# Patient Record
Sex: FEMALE | Race: BLACK OR AFRICAN AMERICAN | Employment: OTHER | URBAN - METROPOLITAN AREA
[De-identification: names, ages, dates, MRNs, and addresses within clinical notes are randomized per-mention and may not be internally consistent; named-entity substitution may affect disease eponyms.]

---

## 2019-05-21 ENCOUNTER — HOSPITAL ENCOUNTER (INPATIENT)
Age: 24
LOS: 3 days | Discharge: HOME | DRG: 389 | End: 2019-05-26
Attending: EMERGENCY MEDICINE | Admitting: INTERNAL MEDICINE
Payer: COMMERCIAL

## 2019-05-21 ENCOUNTER — APPOINTMENT (OUTPATIENT)
Dept: CT IMAGING | Age: 24
DRG: 389 | End: 2019-05-21
Payer: COMMERCIAL

## 2019-05-21 DIAGNOSIS — R11.2 NAUSEA AND VOMITING IN ADULT: ICD-10-CM

## 2019-05-21 DIAGNOSIS — R93.5 ABNORMAL CT OF THE ABDOMEN: Primary | ICD-10-CM

## 2019-05-21 DIAGNOSIS — R10.9 ABDOMINAL PAIN, UNSPECIFIED ABDOMINAL LOCATION: ICD-10-CM

## 2019-05-21 LAB
A/G RATIO: 1.2 (ref 1.1–2.2)
ALBUMIN SERPL-MCNC: 4.7 G/DL (ref 3.4–5)
ALP BLD-CCNC: 69 U/L (ref 40–129)
ALT SERPL-CCNC: 11 U/L (ref 10–40)
AMYLASE: 129 U/L (ref 25–115)
ANION GAP SERPL CALCULATED.3IONS-SCNC: 12 MMOL/L (ref 3–16)
AST SERPL-CCNC: 18 U/L (ref 15–37)
BACTERIA: ABNORMAL /HPF
BASOPHILS ABSOLUTE: 0 K/UL (ref 0–0.2)
BASOPHILS RELATIVE PERCENT: 0.4 %
BILIRUB SERPL-MCNC: 0.3 MG/DL (ref 0–1)
BILIRUBIN URINE: ABNORMAL
BLOOD, URINE: NEGATIVE
BUN BLDV-MCNC: 15 MG/DL (ref 7–20)
CALCIUM SERPL-MCNC: 9.7 MG/DL (ref 8.3–10.6)
CHLORIDE BLD-SCNC: 102 MMOL/L (ref 99–110)
CLARITY: ABNORMAL
CO2: 24 MMOL/L (ref 21–32)
COLOR: YELLOW
CREAT SERPL-MCNC: <0.5 MG/DL (ref 0.6–1.1)
EOSINOPHILS ABSOLUTE: 0 K/UL (ref 0–0.6)
EOSINOPHILS RELATIVE PERCENT: 0.4 %
EPITHELIAL CELLS, UA: 10 /HPF (ref 0–5)
GFR AFRICAN AMERICAN: >60
GFR NON-AFRICAN AMERICAN: >60
GLOBULIN: 4 G/DL
GLUCOSE BLD-MCNC: 124 MG/DL (ref 70–99)
GLUCOSE URINE: NEGATIVE MG/DL
HCG(URINE) PREGNANCY TEST: NEGATIVE
HCT VFR BLD CALC: 41.1 % (ref 36–48)
HEMOGLOBIN: 13.6 G/DL (ref 12–16)
HYALINE CASTS: 14 /LPF (ref 0–8)
KETONES, URINE: 15 MG/DL
LEUKOCYTE ESTERASE, URINE: NEGATIVE
LIPASE: 40 U/L (ref 13–60)
LYMPHOCYTES ABSOLUTE: 0.7 K/UL (ref 1–5.1)
LYMPHOCYTES RELATIVE PERCENT: 7.2 %
MCH RBC QN AUTO: 29.9 PG (ref 26–34)
MCHC RBC AUTO-ENTMCNC: 33 G/DL (ref 31–36)
MCV RBC AUTO: 90.4 FL (ref 80–100)
MICROSCOPIC EXAMINATION: YES
MONOCYTES ABSOLUTE: 0.6 K/UL (ref 0–1.3)
MONOCYTES RELATIVE PERCENT: 5.7 %
NEUTROPHILS ABSOLUTE: 8.7 K/UL (ref 1.7–7.7)
NEUTROPHILS RELATIVE PERCENT: 86.3 %
NITRITE, URINE: NEGATIVE
PDW BLD-RTO: 16.2 % (ref 12.4–15.4)
PH UA: 6 (ref 5–8)
PLATELET # BLD: 252 K/UL (ref 135–450)
PMV BLD AUTO: 10.2 FL (ref 5–10.5)
POTASSIUM SERPL-SCNC: 4 MMOL/L (ref 3.5–5.1)
PROTEIN UA: ABNORMAL MG/DL
RBC # BLD: 4.55 M/UL (ref 4–5.2)
RBC UA: 4 /HPF (ref 0–4)
SODIUM BLD-SCNC: 138 MMOL/L (ref 136–145)
SPECIFIC GRAVITY UA: >1.03 (ref 1–1.03)
TOTAL PROTEIN: 8.7 G/DL (ref 6.4–8.2)
URINE TYPE: ABNORMAL
UROBILINOGEN, URINE: 1 E.U./DL
WBC # BLD: 10.1 K/UL (ref 4–11)
WBC UA: 6 /HPF (ref 0–5)

## 2019-05-21 PROCEDURE — 36415 COLL VENOUS BLD VENIPUNCTURE: CPT

## 2019-05-21 PROCEDURE — G0378 HOSPITAL OBSERVATION PER HR: HCPCS

## 2019-05-21 PROCEDURE — 84703 CHORIONIC GONADOTROPIN ASSAY: CPT

## 2019-05-21 PROCEDURE — 6360000002 HC RX W HCPCS: Performed by: PHYSICIAN ASSISTANT

## 2019-05-21 PROCEDURE — 96375 TX/PRO/DX INJ NEW DRUG ADDON: CPT

## 2019-05-21 PROCEDURE — 6360000004 HC RX CONTRAST MEDICATION: Performed by: PHYSICIAN ASSISTANT

## 2019-05-21 PROCEDURE — 81001 URINALYSIS AUTO W/SCOPE: CPT

## 2019-05-21 PROCEDURE — 85025 COMPLETE CBC W/AUTO DIFF WBC: CPT

## 2019-05-21 PROCEDURE — 2580000003 HC RX 258: Performed by: PHYSICIAN ASSISTANT

## 2019-05-21 PROCEDURE — 99285 EMERGENCY DEPT VISIT HI MDM: CPT

## 2019-05-21 PROCEDURE — 82150 ASSAY OF AMYLASE: CPT

## 2019-05-21 PROCEDURE — 80053 COMPREHEN METABOLIC PANEL: CPT

## 2019-05-21 PROCEDURE — 96361 HYDRATE IV INFUSION ADD-ON: CPT

## 2019-05-21 PROCEDURE — 6360000002 HC RX W HCPCS: Performed by: EMERGENCY MEDICINE

## 2019-05-21 PROCEDURE — 74177 CT ABD & PELVIS W/CONTRAST: CPT

## 2019-05-21 PROCEDURE — 83690 ASSAY OF LIPASE: CPT

## 2019-05-21 PROCEDURE — 96374 THER/PROPH/DIAG INJ IV PUSH: CPT

## 2019-05-21 RX ORDER — ONDANSETRON 2 MG/ML
4 INJECTION INTRAMUSCULAR; INTRAVENOUS EVERY 6 HOURS PRN
Status: DISCONTINUED | OUTPATIENT
Start: 2019-05-21 | End: 2019-05-26 | Stop reason: HOSPADM

## 2019-05-21 RX ORDER — POLYETHYLENE GLYCOL 3350 17 G/17G
17 POWDER, FOR SOLUTION ORAL DAILY
Status: DISCONTINUED | OUTPATIENT
Start: 2019-05-22 | End: 2019-05-23

## 2019-05-21 RX ORDER — 0.9 % SODIUM CHLORIDE 0.9 %
1000 INTRAVENOUS SOLUTION INTRAVENOUS ONCE
Status: COMPLETED | OUTPATIENT
Start: 2019-05-21 | End: 2019-05-21

## 2019-05-21 RX ORDER — SODIUM CHLORIDE 0.9 % (FLUSH) 0.9 %
10 SYRINGE (ML) INJECTION PRN
Status: DISCONTINUED | OUTPATIENT
Start: 2019-05-21 | End: 2019-05-26 | Stop reason: HOSPADM

## 2019-05-21 RX ORDER — ACETAMINOPHEN 325 MG/1
650 TABLET ORAL EVERY 4 HOURS PRN
Status: DISCONTINUED | OUTPATIENT
Start: 2019-05-21 | End: 2019-05-26 | Stop reason: HOSPADM

## 2019-05-21 RX ORDER — SODIUM CHLORIDE 0.9 % (FLUSH) 0.9 %
10 SYRINGE (ML) INJECTION EVERY 12 HOURS SCHEDULED
Status: DISCONTINUED | OUTPATIENT
Start: 2019-05-21 | End: 2019-05-26 | Stop reason: HOSPADM

## 2019-05-21 RX ORDER — ONDANSETRON 2 MG/ML
4 INJECTION INTRAMUSCULAR; INTRAVENOUS ONCE
Status: COMPLETED | OUTPATIENT
Start: 2019-05-21 | End: 2019-05-21

## 2019-05-21 RX ORDER — MORPHINE SULFATE 4 MG/ML
4 INJECTION, SOLUTION INTRAMUSCULAR; INTRAVENOUS ONCE
Status: COMPLETED | OUTPATIENT
Start: 2019-05-21 | End: 2019-05-21

## 2019-05-21 RX ORDER — MORPHINE SULFATE 2 MG/ML
2 INJECTION, SOLUTION INTRAMUSCULAR; INTRAVENOUS EVERY 4 HOURS PRN
Status: DISCONTINUED | OUTPATIENT
Start: 2019-05-21 | End: 2019-05-22

## 2019-05-21 RX ADMIN — SODIUM CHLORIDE 1000 ML: 9 INJECTION, SOLUTION INTRAVENOUS at 19:42

## 2019-05-21 RX ADMIN — IOPAMIDOL 75 ML: 755 INJECTION, SOLUTION INTRAVENOUS at 20:21

## 2019-05-21 RX ADMIN — ONDANSETRON 4 MG: 2 INJECTION INTRAMUSCULAR; INTRAVENOUS at 19:42

## 2019-05-21 RX ADMIN — MORPHINE SULFATE 4 MG: 4 INJECTION INTRAVENOUS at 22:38

## 2019-05-21 ASSESSMENT — PAIN DESCRIPTION - PAIN TYPE
TYPE: ACUTE PAIN
TYPE: ACUTE PAIN

## 2019-05-21 ASSESSMENT — PAIN DESCRIPTION - LOCATION
LOCATION: ABDOMEN
LOCATION: ABDOMEN

## 2019-05-21 ASSESSMENT — PAIN SCALES - GENERAL
PAINLEVEL_OUTOF10: 10
PAINLEVEL_OUTOF10: 2

## 2019-05-21 ASSESSMENT — ENCOUNTER SYMPTOMS
ABDOMINAL PAIN: 1
DIARRHEA: 0
BACK PAIN: 0
CONSTIPATION: 0
NAUSEA: 1
ANAL BLEEDING: 0
SHORTNESS OF BREATH: 0
COUGH: 0
ABDOMINAL DISTENTION: 0
COLOR CHANGE: 0
VOMITING: 1
BLOOD IN STOOL: 0

## 2019-05-22 ENCOUNTER — APPOINTMENT (OUTPATIENT)
Dept: GENERAL RADIOLOGY | Age: 24
DRG: 389 | End: 2019-05-22
Payer: COMMERCIAL

## 2019-05-22 PROBLEM — K56.609 SBO (SMALL BOWEL OBSTRUCTION) (HCC): Status: ACTIVE | Noted: 2019-05-22

## 2019-05-22 PROBLEM — K50.00 ILEITIS, TERMINAL (HCC): Status: ACTIVE | Noted: 2019-05-22

## 2019-05-22 PROCEDURE — G0378 HOSPITAL OBSERVATION PER HR: HCPCS

## 2019-05-22 PROCEDURE — 96376 TX/PRO/DX INJ SAME DRUG ADON: CPT

## 2019-05-22 PROCEDURE — 96375 TX/PRO/DX INJ NEW DRUG ADDON: CPT

## 2019-05-22 PROCEDURE — APPNB30 APP NON BILLABLE TIME 0-30 MINS: Performed by: NURSE PRACTITIONER

## 2019-05-22 PROCEDURE — 2580000003 HC RX 258: Performed by: INTERNAL MEDICINE

## 2019-05-22 PROCEDURE — 6360000002 HC RX W HCPCS: Performed by: INTERNAL MEDICINE

## 2019-05-22 PROCEDURE — 74018 RADEX ABDOMEN 1 VIEW: CPT

## 2019-05-22 PROCEDURE — 74250 X-RAY XM SM INT 1CNTRST STD: CPT

## 2019-05-22 PROCEDURE — APPSS60 APP SPLIT SHARED TIME 46-60 MINUTES: Performed by: NURSE PRACTITIONER

## 2019-05-22 PROCEDURE — 2500000003 HC RX 250 WO HCPCS: Performed by: INTERNAL MEDICINE

## 2019-05-22 PROCEDURE — 99222 1ST HOSP IP/OBS MODERATE 55: CPT | Performed by: SURGERY

## 2019-05-22 RX ORDER — SODIUM CHLORIDE 9 MG/ML
INJECTION, SOLUTION INTRAVENOUS CONTINUOUS
Status: DISCONTINUED | OUTPATIENT
Start: 2019-05-22 | End: 2019-05-24

## 2019-05-22 RX ORDER — HYDROMORPHONE HYDROCHLORIDE 1 MG/ML
0.5 INJECTION, SOLUTION INTRAMUSCULAR; INTRAVENOUS; SUBCUTANEOUS EVERY 4 HOURS PRN
Status: DISCONTINUED | OUTPATIENT
Start: 2019-05-22 | End: 2019-05-26 | Stop reason: HOSPADM

## 2019-05-22 RX ADMIN — MORPHINE SULFATE 2 MG: 2 INJECTION, SOLUTION INTRAMUSCULAR; INTRAVENOUS at 06:10

## 2019-05-22 RX ADMIN — MORPHINE SULFATE 2 MG: 2 INJECTION, SOLUTION INTRAMUSCULAR; INTRAVENOUS at 15:34

## 2019-05-22 RX ADMIN — FAMOTIDINE 20 MG: 10 INJECTION, SOLUTION INTRAVENOUS at 09:11

## 2019-05-22 RX ADMIN — Medication 10 ML: at 09:11

## 2019-05-22 RX ADMIN — Medication 10 ML: at 00:26

## 2019-05-22 RX ADMIN — SODIUM CHLORIDE: 9 INJECTION, SOLUTION INTRAVENOUS at 12:08

## 2019-05-22 RX ADMIN — FAMOTIDINE 20 MG: 10 INJECTION, SOLUTION INTRAVENOUS at 00:26

## 2019-05-22 RX ADMIN — HYDROMORPHONE HYDROCHLORIDE 0.5 MG: 1 INJECTION, SOLUTION INTRAMUSCULAR; INTRAVENOUS; SUBCUTANEOUS at 23:03

## 2019-05-22 RX ADMIN — MORPHINE SULFATE 2 MG: 2 INJECTION, SOLUTION INTRAMUSCULAR; INTRAVENOUS at 12:09

## 2019-05-22 RX ADMIN — HYDROMORPHONE HYDROCHLORIDE 0.5 MG: 1 INJECTION, SOLUTION INTRAMUSCULAR; INTRAVENOUS; SUBCUTANEOUS at 18:53

## 2019-05-22 RX ADMIN — FAMOTIDINE 20 MG: 10 INJECTION, SOLUTION INTRAVENOUS at 21:36

## 2019-05-22 ASSESSMENT — PAIN SCALES - GENERAL
PAINLEVEL_OUTOF10: 10
PAINLEVEL_OUTOF10: 8
PAINLEVEL_OUTOF10: 4
PAINLEVEL_OUTOF10: 9
PAINLEVEL_OUTOF10: 6
PAINLEVEL_OUTOF10: 5
PAINLEVEL_OUTOF10: 9
PAINLEVEL_OUTOF10: 5
PAINLEVEL_OUTOF10: 7

## 2019-05-22 ASSESSMENT — PAIN DESCRIPTION - PROGRESSION
CLINICAL_PROGRESSION: NOT CHANGED

## 2019-05-22 ASSESSMENT — PAIN DESCRIPTION - ORIENTATION
ORIENTATION: LOWER

## 2019-05-22 ASSESSMENT — PAIN DESCRIPTION - PAIN TYPE
TYPE: ACUTE PAIN

## 2019-05-22 ASSESSMENT — PAIN DESCRIPTION - ONSET
ONSET: ON-GOING

## 2019-05-22 ASSESSMENT — PAIN DESCRIPTION - DESCRIPTORS
DESCRIPTORS: DULL

## 2019-05-22 ASSESSMENT — PAIN DESCRIPTION - FREQUENCY
FREQUENCY: CONTINUOUS

## 2019-05-22 ASSESSMENT — PAIN DESCRIPTION - LOCATION
LOCATION: ABDOMEN

## 2019-05-22 NOTE — ED NOTES
Report given to [facility] over phone. Pt alert and oriented and shows no signs of distress at time of transfer to [facility]. Pt taken to room by ED Tech in wheelchair.         Sridhar Flynn RN  05/21/19 7771

## 2019-05-22 NOTE — H&P
HOSPITALISTS HISTORY AND PHYSICAL    5/21/2019 11:30 PM    Patient Information:  Skye Hansen is a 30 y.o. female [id number]  PCP:  [facility] (Tel: [phone] )    Chief complaint:    Chief Complaint   Patient presents with    Abdominal Pain     pt states she has had abd pain with n/v since 11am today. History of Present Illness:  Nitza Marie is 30 y.o. female who presented with complaint of abdominal pain. Symptom onset was acute for a time period of 1 day. The severity is described as severe. The course of his symptoms over time is improved. The symptoms improved with IV morphine and worsened with constipation. The patient's symptom is associated with nausea. Nitza Marie is 30 y.o. female without significant past medical history. She presents to the ER with complaint of abdominal pain  It started today, as sharp, squeezing type and located in the mid abdomen. She says she always had abdominal issues   But for the past 1 year she had intermittent abdominal pain about once every month  She had a normal BM today. She says her abdominal pain has become less frequent and severe because she now avoids constipation    History obtained from patient and chart review. Old medical records show -  No recent admission to this hospital     REVIEW OF SYSTEMS:   Constitutional:  Negative for fever,chills or night sweats  ENT:  Negative for rhinorrhea, epistaxis, hoarseness, sore throat. Respiratory:   Negative for shortness of breath,wheezing  Cardiovascular:   Negative for  chest pain, palpitations   Gastrointestinal:  Negative for nausea, vomiting, diarrhea  Genitourinary:  Negative for polyuria, dysuria   Hematologic/Lymphatic:  Negative for  bleeding tendency, easy bruising  Musculoskeletal:  Negative for myalgias and arthralgias  Neurologic:  Negative for  confusion,dysarthria. Skin:  Negative for itching,rash  Psychiatric:  Negative for depression,anxiety, agitation.   Endocrine: Negative for polydipsia,polyuria,heat /cold intolerance. Past Medical History:   has a past medical history of Asthma. Past Surgical History:   has a past surgical history that includes Appendectomy. Medications: She doesn't take any medications at home       Allergies:  No Known Allergies     Social History:   reports that she has never smoked. She does not have any smokeless tobacco history on file. She reports that she drinks alcohol. Family History:  No pertinent family history     Physical Exam:  /72   Pulse 76   Temp 98.4 °F (36.9 °C) (Oral)   Resp 16   Ht 5' 5\" (1.651 m)   Wt 130 lb (59 kg)   LMP 05/21/2019   SpO2 100%   BMI 21.63 kg/m²     General appearance:  Appears comfortable. Well nourished  Eyes: Sclera clear, pupils equal  ENT: Moist mucus membranes, no thrush. Trachea midline. Cardiovascular: Regular rhythm, normal S1, S2. No murmur, gallop, rub. No edema in lower extremities  Respiratory: Clear to auscultation bilaterally, no wheeze, good inspiratory effort  Gastrointestinal: Abdomen soft, non-tender, not distended, normal bowel sounds  Musculoskeletal: No cyanosis in digits, neck supple  Neurology: Cranial nerves grossly intact. Alert and oriented in time, place and person. No speech or motor deficits  Psychiatry: Appropriate affect.  Not agitated  Skin: Warm, dry, normal turgor, no rash    Labs:  CBC:   Lab Results   Component Value Date    WBC 10.1 05/21/2019    RBC 4.55 05/21/2019    HGB 13.6 05/21/2019    HCT 41.1 05/21/2019    MCV 90.4 05/21/2019    MCH 29.9 05/21/2019    MCHC 33.0 05/21/2019    RDW 16.2 05/21/2019     05/21/2019    MPV 10.2 05/21/2019     BMP:    Lab Results   Component Value Date     05/21/2019    K 4.0 05/21/2019     05/21/2019    CO2 24 05/21/2019    BUN 15 05/21/2019    CREATININE <0.5 05/21/2019    CALCIUM 9.7 05/21/2019    GFRAA >60 05/21/2019    LABGLOM >60 05/21/2019    GLUCOSE 124 05/21/2019       I visualized CT images CT abd/pelvis   Disproportionate small bowel distention throughout the entire abdomen and the  pelvis, greatest in the pelvis.  Disproportionate nature raises the question  of a small bowel obstruction.  There is questionable wall thickening at the  terminal ileal level.  This raises the question of terminal ileitis. There is also multifocal colonic incomplete distention.  Additional  thickening in the colon would be difficult to exclude. Discussed case with ED provider - DEDE Middleton     Problem List  Active Problems:    Abdominal pain  Resolved Problems:    * No resolved hospital problems. *        Assessment/Plan:   1. Abdominal pain - etiology is not clear. ddx inflammatory bowel syndrome or ileitis. Consult GI. Morphine prn for pain control. NPO. Clinically no evidence of small bowel obstruction although CT suggest that. IV pepcid. 2. Constipation - I think she need to be on laxatives on regular basis     Admit as observation.      Chris Swenson MD    5/21/2019 11:30 PM

## 2019-05-22 NOTE — ED PROVIDER NOTES
[facility] Emergency Department      Pt Name: Josh Tovar  MRN: [id number]  [place] 1995  Date of evaluation: 5/21/2019  Provider: Mich Sykes MD  I independently performed a history and physical on Josh Tovar. All diagnostic, treatment, and disposition decisions were made by myself in conjunction with the advanced practice provider. HPI: Josh Tovar presented with   Chief Complaint   Patient presents with    Abdominal Pain     pt states she has had abd pain with n/v since 11am today. Josh Tovar has a past medical history of Asthma. She has a past surgical history that includes Appendectomy. No current facility-administered medications on file prior to encounter. No current outpatient medications on file prior to encounter. PHYSICAL EXAM  Vitals: BP 95/62   Pulse 65   Temp 98.1 °F (36.7 °C) (Oral)   Resp 16   Ht 5' 5\" (1.651 m)   Wt 130 lb (59 kg)   LMP 05/21/2019   SpO2 99%   BMI 21.63 kg/m²   Constitutional:  30 y.o. female alert  HENT:  Atraumatic, oral mucosa moist  Neck:  No visible JVD, supple  Chest/Lungs:  Respiratory effort normal, clear, regular  Abdomen:  Slightly distended, tenderness  Back:  No gross deformity  Extremities:  Normal tone and perfusion    Medical Decision Making and Plan: Briefly, this is an 30 y. o.female who presented with abdominal pain. Abnormal CT imaging noted. Plan is to admit for further care. For further details of [facility] Emergency Department encounter, please see documentation by advanced practice provider DEDE Mendoza.     Labs Reviewed   COMPREHENSIVE METABOLIC PANEL - Abnormal; Notable for the following components:       Result Value    Glucose 124 (*)     CREATININE <0.5 (*)     Total Protein 8.7 (*)     All other components within normal limits    Narrative:     Performed at:  [facility]  [address], [address]   Phone [phone] [address] This raises the question of terminal ileitis. There is also multifocal colonic incomplete distention. Additional   thickening in the colon would be difficult to exclude. Medications administered:  Medications   sodium chloride flush 0.9 % injection 10 mL (10 mLs Intravenous Given 5/22/19 0026)   sodium chloride flush 0.9 % injection 10 mL (has no administration in time range)   magnesium hydroxide (MILK OF MAGNESIA) 400 MG/5ML suspension 30 mL (has no administration in time range)   ondansetron (ZOFRAN) injection 4 mg (has no administration in time range)   enoxaparin (LOVENOX) injection 40 mg (has no administration in time range)   acetaminophen (TYLENOL) tablet 650 mg (has no administration in time range)   famotidine (PEPCID) injection 20 mg (20 mg Intravenous Given 5/22/19 0026)   morphine (PF) injection 2 mg (has no administration in time range)   polyethylene glycol (GLYCOLAX) packet 17 g (has no administration in time range)   0.9 % sodium chloride bolus (0 mLs Intravenous Stopped 5/21/19 2124)   ondansetron (ZOFRAN) injection 4 mg (4 mg Intravenous Given 5/21/19 1942)   iopamidol (ISOVUE-370) 76 % injection 75 mL (75 mLs Intravenous Given 5/21/19 2021)   morphine injection 4 mg (4 mg Intravenous Given 5/21/19 2238)     FINAL IMPRESSION:    1. Abnormal CT of the abdomen    2. Nausea and vomiting in adult    3.  Abdominal pain, unspecified abdominal location       DISPOSITION Admitted 05/21/2019 09:41:21 PM     Meeta Cortes MD  05/22/19 2716

## 2019-05-22 NOTE — PLAN OF CARE
[place] [address] and Laparoscopic Surgery        Assessment & Plan of Care    History of Present Illness: The patient presented to the Emergency Department yesterday evening with complaints of severe periumbilical abdominal pain that she describes as both burning and sharp that radiated down into the pelvis. The pain began yesterday while the patient was at work, around 11:00 am after she had chicken and noodles for lunch. Associated symptoms include abdominal \"tightening\", bloating, nausea, vomiting, hot flashes, lightheadedness, and she reports feeling \"rumbling of gas\" in her abdomen. No alleviating or aggravating factors noted. She denies diarrhea, constipation, chest pain, or SOB. She reports a normal bowel movement yesterday prior to the onset of symptoms. She also states that her bowel movements aren't regular at baseline and it is not uncommon for her to have diarrhea at times. Has hemorrhoids and occasionally notices blood in her stool. She reports similar but less severe symptoms about once per month over the last 9 months, but they have always been self-limiting, and she has not sought out medical evaluation in the past.  No family or personal history of ulcerative colitis or Crohn's disease; however, she is unaware of her father's medical history. No significant medical history. Only prior abdominal surgery was an appendectomy in 2016 for perforated appendicitis. No blood thinners. Nonsmoker. At present she reports a resolution of her abdominal pain and nausea, she has passed flatus today, but still has some bloating.     Physical Exam:  CONSTITUTIONAL:  alert, no apparent distress and normal weight  NEUROLOGIC:  Mental Status Exam:  Level of Alertness:   awake  Orientation:   person, place, time  EYES:  sclera clear  ENT:  normocepalic, without obvious abnormality  NECK:  supple, symmetrical, trachea midline  LUNGS:  clear to auscultation  CARDIOVASCULAR:  regular rate and rhythm

## 2019-05-22 NOTE — PROGRESS NOTES
Assessment complete, see flowsheet. POC reviewed with the patient and mutually agreed upon. NPO at this time. VSS. Scheduled to have 2-view x-ray this am.  Call light within reach. Will monitor. 1840:  Patient requesting something stronger for pain. Dr. Maira Chung. Will monitor.

## 2019-05-22 NOTE — PROGRESS NOTES
4 Eyes Skin Assessment     The patient is being assess for  Admission    I agree that 2 RN's have performed a thorough Head to Toe Skin Assessment on the patient. ALL assessment sites listed below have been assessed. Areas assessed by both nurses:   [x]   Head, Face, and Ears   [x]   Shoulders, Back, and Chest  [x]   Arms, Elbows, and Hands   [x]   Coccyx, Sacrum, and IschIum  [x]   Legs, Feet, and Heels        Does the Patient have Skin Breakdown?   No         Antonio Prevention initiated:  NA   Wound Care Orders initiated:  NA      [facility] nurse consulted for Pressure Injury (Stage 3,4, Unstageable, DTI, NWPT, and Complex wounds), New and Established Ostomies:  NA      Nurse 1 eSignature: Electronically signed by Yg Camara RN on 5/22/19 at 1:36 AM    **SHARE this note so that the co-signing nurse is able to place an eSignature**    Nurse 2 eSignature: {Esignature:[id number]}

## 2019-05-22 NOTE — ED PROVIDER NOTES
[address] [facility]  eMERGENCY dEPARTMENT eNCOUnter    Pt Name: Nydia Laurent  MRN: [id number]  [place] 1995  Date of evaluation: 5/21/2019  Provider: DEDE Saucedo    Chief Complaint:    Chief Complaint   Patient presents with    Abdominal Pain     pt states she has had abd pain with n/v since 11am today. Nursing Notes, Past Medical Hx, Past Surgical Hx, Social Hx, Allergies, and Family Hx were all reviewed and agreedwith or any disagreements were addressed in the HPI.    HPI:  (Location, Duration, Timing, Severity, Quality, Assoc Sx, Context, Modifying factors)  This is a  30 y.o. female who presents to the emergency department with complaints of left sided abdominal pain with nausea and vomiting since 11AM.  Patient states that she is intermittently had this type of pain but normally is on the right side, this time it is different on the left side. Has never had admission for this abdominal pain before. She states last time was about 2 months ago. She's had previous appendectomy. She does report nausea and vomiting but denies diarrhea. She states that she had a bowel movement earlier today and it was normal appearance. Denies any urinary frequency, urgency, dysuria hematuria, fevers or chills. Denies abdominal pain. Patient denies any other complaints, no aggravating or relieving factors, pain 10 out of 10 in severity. No radiation. All other systems were reviewed and are negative. Past Medical/Surgical History:      Diagnosis Date    Asthma          Procedure Laterality Date    APPENDECTOMY         Medications:  Previous Medications    No medications on file     Review of Systems:  Review of Systems   Constitutional: Negative for chills, fatigue and fever. Respiratory: Negative for cough and shortness of breath. Cardiovascular: Negative for chest pain. Gastrointestinal: Positive for abdominal pain, nausea and vomiting.  Negative for abdominal distention, anal bleeding, blood in stool, constipation and diarrhea. Genitourinary: Negative for decreased urine volume, difficulty urinating, dysuria, flank pain, frequency, hematuria and urgency. Musculoskeletal: Negative for back pain and neck pain. Skin: Negative for color change and rash. Neurological: Negative for weakness and numbness. All other systems reviewed and are negative. Positives and Pertinent negatives as per HPI. Except as noted above in the ROS, all other systems were reviewed/completed and are negative. Physical Exam:  Physical Exam   Constitutional: She is oriented to person, place, and time. She appears well-developed and well-nourished. She is active and cooperative. Non-toxic appearance. HENT:   Head: Normocephalic. Right Ear: External ear normal.   Left Ear: External ear normal.   Nose: Nose normal.   Eyes: Conjunctivae are normal. Right eye exhibits no discharge. Left eye exhibits no discharge. Neck: Normal range of motion. Neck supple. Cardiovascular: Normal rate, regular rhythm and normal heart sounds. Exam reveals no gallop and no friction rub. No murmur heard. Pulmonary/Chest: Effort normal and breath sounds normal. No respiratory distress. She has no wheezes. She has no rales. Abdominal: Soft. Normal appearance and bowel sounds are normal. She exhibits no distension, no ascites and no mass. There is tenderness in the left upper quadrant and left lower quadrant. There is no rebound, no guarding, no CVA tenderness, no tenderness at McBurney's point and negative Dyer's sign. Musculoskeletal: Normal range of motion. Neurological: She is alert and oriented to person, place, and time. Skin: Skin is warm and dry. She is not diaphoretic. No pallor. Psychiatric: She has a normal mood and affect. Her behavior is normal.   Nursing note and vitals reviewed.     MEDICAL DECISION MAKING    Vitals:    Vitals:    05/21/19 1848 05/21/19 2039   BP: 118/81 112/67 Pulse: 84 86   Resp: 18 16   Temp: 98.5 °F (36.9 °C)    TempSrc: Oral    SpO2: 100% 100%   Weight: 130 lb (59 kg)    Height: 5' 5\" (1.651 m)        LABS:   Labs Reviewed   COMPREHENSIVE METABOLIC PANEL - Abnormal; Notable for the following components:       Result Value    Glucose 124 (*)     CREATININE <0.5 (*)     Total Protein 8.7 (*)     All other components within normal limits    Narrative:     Performed at:  [facility] [address], [place], [email or web address]   Phone [phone]   CBC WITH AUTO DIFFERENTIAL - Abnormal; Notable for the following components:    RDW 16.2 (*)     Neutrophils # 8.7 (*)     Lymphocytes # 0.7 (*)     All other components within normal limits    Narrative:     Performed at:  [facility] [address], [place], [email or web address]   Phone [phone]   AMYLASE - Abnormal; Notable for the following components:    Amylase 129 (*)     All other components within normal limits    Narrative:     Performed at:  [facility] [address], [place], [address] [employer]   Phone [phone]   URINALYSIS - Abnormal; Notable for the following components:    Clarity, UA CLOUDY (*)     Bilirubin Urine SMALL (*)     Ketones, Urine 15 (*)     Protein, UA TRACE (*)     All other components within normal limits    Narrative:     Performed at:  [facility] [address], [place], [email or web address]   Phone [phone]   MICROSCOPIC URINALYSIS - Abnormal; Notable for the following components:    Bacteria, UA 1+ (*)     Hyaline Casts, UA 14 (*)     WBC, UA 6 (*)     Epi Cells 10 (*)     All other components within normal limits    Narrative:     Performed at:  [facility] [address], [place], [email or web address]   Phone [phone]   LIPASE    Narrative:     Performed at:  [facility]  [address] [place] [address]   Phone [phone]   PREGNANCY, URINE    Narrative:     Performed at:  [facility]  [address] [facility],  [place], [address]   Phone [phone] of labs reviewed and were negative at this time or not returned at the time of this note. RADIOLOGY:   Non-plain film images suchas CT, Ultrasound and MRI are read by the radiologist. Roma VILLEDA PA have directly visualized the radiologic plain film image(s) with the below findings:  Interpretation per the Radiologist below, if available at the time of this note:    CT ABDOMEN PELVIS W IV CONTRAST Additional Contrast? None   Final Result   Disproportionate small bowel distention throughout the entire abdomen and the   pelvis, greatest in the pelvis. Disproportionate nature raises the question   of a small bowel obstruction. There is questionable wall thickening at the   terminal ileal level. This raises the question of terminal ileitis. There is also multifocal colonic incomplete distention. Additional   thickening in the colon would be difficult to exclude. MEDICAL DECISION MAKING / ED COURSE:      PROCEDURES:   Procedures    Patient was given:  Medications   0.9 % sodium chloride bolus (1,000 mLs Intravenous New Bag 5/21/19 1942)   ondansetron (ZOFRAN) injection 4 mg (4 mg Intravenous Given 5/21/19 1942)   iopamidol (ISOVUE-370) 76 % injection 75 mL (75 mLs Intravenous Given 5/21/19 2021)   This is a  30 y.o. female who presents to the emergency department with complaints of left sided abdominal pain with nausea and vomiting since 11AM.  Patient states that she is intermittently had this type of pain but normally is on the right side, this time it is different on the left side. Has never had admission for this abdominal pain before. She states last time was about 2 months ago. She's had previous appendectomy. She does report nausea and vomiting but denies diarrhea.   She states that she had a bowel movement earlier today and it was normal appearance. Denies any urinary frequency, urgency, dysuria hematuria, fevers or chills. Denies abdominal pain. Patient denies any other complaints, no aggravating or relieving factors, pain 10 out of 10 in severity. No radiation. Examination patient is well-appearing but does have tenderness to the left side of her abdomen, is nondistended and nonsurgical.  UA on impressive for UTI, negative pregnancy. Normal white count, LFTs. Lipase is normal.  Patient does have an abnormality on her CT imaging and radiology is considering small bowel obstruction. She did have a normal bowel movement earlier today though. There is also possibility of terminal ileitis or thickening in the colon. With multiple abnormal findings on her CT scan, discussed with hospitalist has agreed to bring patient for further care and management and will likely need either GI or surgical consultation. I discussed with patient and family and they're agreeable with this plan of care. Well-appearing prior to floor placement. The patient tolerated their visit well. I have evaluated the patient with physician available for consultation as needed. I have discussed the findings of today's workup with the patient and addressed the patient's questions and concerns. CLINICAL IMPRESSION:  1. Abnormal CT of the abdomen    2. Nausea and vomiting in adult    3. Abdominal pain, unspecified abdominal location        DISPOSITION Decision To Admit 05/21/2019 09:08:01 PM      PATIENT REFERRED TO:  No follow-up provider specified.     DISCHARGE MEDICATIONS:  New Prescriptions    No medications on file              (Please note the MDM and HPI sections of this note were completed with avoice recognition program.  Efforts were made to edit the dictations but occasionally words are mis-transcribed.)    Electronically signed, DEDE Manning,             DEDE Middleton  05/21/19 1599

## 2019-05-22 NOTE — PROGRESS NOTES
Hospitalist Progress Note      PCP: [facility]    Date of Admission: 5/21/2019    LOS: 0    Chief Complaint:   Chief Complaint   Patient presents with    Abdominal Pain     pt states she has had abd pain with n/v since 11am today. Case Summary:   8year-old lady with no significant past medical history who presented with complaints of mid abdominal pain of acute onset with associated nausea and vomiting in the setting of her normal bowel motion on the day of admission. CT of the abdomen was concerning for probable terminal ileitis and small bowel obstruction. Active Hospital Problems    Diagnosis Date Noted    SBO (small bowel obstruction) ([address] [K56.609] 05/22/2019    Ileitis, terminal ([address] [K50.00] 05/22/2019    Abdominal pain [R10.9] 05/21/2019         Principal Problem:    SBO (small bowel obstruction) (HCC)    Abdominal pain    Ileitis, terminal ([address]  Assessment: Abdominal pain markedly improved this morning from a 10/10-3/10. CT abdomen noted for dilated small bowel done to the ileum with ileal thickening. Differential diagnosis include I ileitis, Crohn's. He was consulted by GI. Plan:  -Follow-up interval abdominal imaging this morning  -GI recommended general surgery consult  -Continue IV fluids and nothing by mouth at this time  -Pain management with when necessary narcotics      Medications:  Reviewed  Infusion Medications   Scheduled Medications    sodium chloride flush  10 mL Intravenous 2 times per day    enoxaparin  40 mg Subcutaneous Nightly    famotidine (PEPCID) injection  20 mg Intravenous BID    polyethylene glycol  17 g Oral Daily     PRN Meds: sodium chloride flush, magnesium hydroxide, ondansetron, acetaminophen, morphine      DVT Prophylaxis: Subcutaneous enoxaparin  Diet: Diet NPO Effective Now Exceptions are: Sips with Meds  Code Status: Full Code    Dispo:  Anticipate discharge in the next 24-48 hrs    ____________________________________________________________________________    Subjective:   Overnight Events:   Improved abdominal pains, nausea and vomiting  Pain controlled with       Physical Exam Performed:  /63   Pulse 68   Temp 98.3 °F (36.8 °C) (Oral)   Resp 15   Ht 5' 5\" (1.651 m)   Wt 130 lb (59 kg)   LMP 05/21/2019   SpO2 99%   BMI 21.63 kg/m²   General appearance: No apparent distress, appears stated age and cooperative. HEENT: Normocephalic, atraumatic, MMM, No sclera icterus/conjuctival palor  Neck: Supple, no thyromegally. No jugular venous distention. Respiratory:  Normal respiratory effort. Clear to auscultation, no Rales/Wheezes/Rhonchi. Cardiovascular: S1/S2 without murmurs, rubs or gallops. RRR  Abdomen: Soft, mild periumbilical tenderness, non-distended, bowel sounds present. Musculoskeletal: No clubbing, cyanosis or edema bilaterally. Skin: Skin color, texture, turgor normal.  No rashes or lesions. Neurologic:  Cranial nerves: II-XII intact, ISABEL, No focal sensory/motor deficits    No intake or output data in the 24 hours ending 05/22/19 1118    Labs:   Recent Labs     05/21/19  1855   WBC 10.1   HGB 13.6   HCT 41.1         Recent Labs     05/21/19  1855      K 4.0      CO2 24   BUN 15   CREATININE <0.5*   CALCIUM 9.7   AST 18   ALT 11   BILITOT 0.3   ALKPHOS 69     No results for input(s): Mayra Mejía in the last 72 hours. Urinalysis:    Lab Results   Component Value Date    NITRU Negative 05/21/2019    WBCUA 6 05/21/2019    BACTERIA 1+ 05/21/2019    RBCUA 4 05/21/2019    BLOODU Negative 05/21/2019    SPECGRAV >1.030 05/21/2019    GLUCOSEU Negative 05/21/2019       Radiology:  CT ABDOMEN PELVIS W IV CONTRAST Additional Contrast? None   Final Result   Disproportionate small bowel distention throughout the entire abdomen and the   pelvis, greatest in the pelvis.   Disproportionate nature raises the question   of a small bowel obstruction. There is questionable wall thickening at the   terminal ileal level. This raises the question of terminal ileitis. There is also multifocal colonic incomplete distention. Additional   thickening in the colon would be difficult to exclude.          XR ABDOMEN (2 VIEWS)    (Results Pending)           Jose Armando Miguel MD

## 2019-05-22 NOTE — CONSULTS
or rubs  Abdomen: soft, mild central tenderness. Bowel sounds normal. No masses,  no organomegaly. Mild distention. Extremities: atraumatic, no cyanosis or edema  Skin: warm and dry, no jaundice  Neuro: Grossly intact, A&OX3  5/5  bue  Mood good affect congruent. Data Review:    Recent Labs     05/21/19 1855   WBC 10.1   HGB 13.6   HCT 41.1   MCV 90.4        Recent Labs     05/21/19 1855      K 4.0      CO2 24   BUN 15   CREATININE <0.5*     Recent Labs     05/21/19 1855   AST 18   ALT 11   BILITOT 0.3   ALKPHOS 69     Recent Labs     05/21/19 1855   LIPASE 40.0   AMYLASE 129*       Ct ab reviewed. Assessment:     1. sbo at ileum per ct. No h/o ibd. Did have appy in last 1-2 years. ddx includes ileitis, crohn's, adhesive related obstruction. Recommendations:   1. F/u xrays this am  2. Surgery to see. 3. May need ng pending xrays  4. Further recs pending hospital course.      Christina Alvarez MD  [address]

## 2019-05-22 NOTE — PROGRESS NOTES
-Pt admitted for abdominal pain, CT showed possible SBO, NPO, GI to be consulted  -VSS, prn morphine ordered for pain, independent in the room, family at bedside   The care plan and education has been reviewed and mutually agreed upon with the patient, call light within reach, will continue to monitor.

## 2019-05-22 NOTE — CONSULTS
[address] and Laparoscopic Surgery     Consult                                                     Patient Name: Kianna Galvan  MRN: [id number]  [place]: 1995  Admission date: 5/21/2019  7:14 PM   Date of evaluation: 5/22/2019  Primary Care Physician[facility]  Requesting physician: Joann Adams MD  Reason for consult: Abdominal pain  History of Present Illness:    Ms. Annemarie Dominguez is a 30 y.o. female who presents with acute onset periumbilical burning sharp pain that radiates down to her pelvis yesterday morning. The pain was constant, progressively worse, associated with nausea, vomiting, but denies fevers, chills, chest pain, dyspnea, jaundice, change in appetite, change in weight, change in bowel movements, dysuria or other complaints. Last bowel movement was yesterday morning as symptoms began. Over the last 9 months the patient has had similar attacks that all were self-limited lasting less than a day. She has known hemorrhoids and occasionally notes blood in her stool. No personal or family history of inflammatory bowel disease or colorectal malignancy. No significant medical conditions, surgical history includes laparoscopic appendectomy for perforated appendicitis in 2016. Workup in ED consistent with small bowel obstruction vs terminal ileitis. Her symptoms have completely resolved other than mild bloating.  She's passed flatus but no stool    Past Medical History:        Diagnosis Date    Asthma        Past Surgical History:        Procedure Laterality Date    APPENDECTOMY         Scheduled Meds:   sodium chloride flush  10 mL Intravenous 2 times per day    enoxaparin  40 mg Subcutaneous Nightly    famotidine (PEPCID) injection  20 mg Intravenous BID    polyethylene glycol  17 g Oral Daily     Continuous Infusions:   sodium chloride 100 mL/hr at 05/22/19 1208     PRN Meds:.sodium chloride flush, magnesium hydroxide, ondansetron, acetaminophen, morphine    Prior to Admission medications    Not on File        Allergies:  Patient has no known allergies. Social History:   Social History     Socioeconomic History    Marital status: Single     Spouse name: None    Number of children: None    Years of education: None    Highest education level: None   Occupational History    None   Social Needs    Financial resource strain: None    Food insecurity:     Worry: None     Inability: None    Transportation needs:     Medical: None     Non-medical: None   Tobacco Use    Smoking status: Never Smoker   Substance and Sexual Activity    Alcohol use: Yes    Drug use: None    Sexual activity: None   Lifestyle    Physical activity:     Days per week: None     Minutes per session: None    Stress: None   Relationships    Social connections:     Talks on phone: None     Gets together: None     Attends Yarsanism service: None     Active member of club or organization: None     Attends meetings of clubs or organizations: None     Relationship status: None    Intimate partner violence:     Fear of current or ex partner: None     Emotionally abused: None     Physically abused: None     Forced sexual activity: None   Other Topics Concern    None   Social History Narrative    None       Family History:    History reviewed. No pertinent family history.     Review of Systems:  CONSTITUTIONAL:  Negative except as above  HEENT:  negative  RESPIRATORY:  negative  CARDIOVASCULAR:  negative  GASTROINTESTINAL:  negative except as above   GENITOURINARY:  negative  HEMATOLOGIC/LYMPHATIC:  negative  NEUROLOGICAL:  Negative      Vital Signs:  Patient Vitals for the past 24 hrs:   BP Temp Temp src Pulse Resp SpO2 Height Weight   05/22/19 0900 102/63 98.3 °F (36.8 °C) Oral 68 15 99 % -- --   05/22/19 0600 (!) 93/59 98.4 °F (36.9 °C) Oral 90 16 98 % -- --   05/21/19 2330 95/62 98.1 °F (36.7 °C) Oral 65 16 99 % -- --   05/21/19 2300 104/72 98.4 °F (36.9 °C) Oral 76 16 100 % -- --   05/21/19 2145 115/65 -- -- 80 16 100 % -- --   19 112/67 -- -- 86 16 100 % -- --   19 1848 118/81 98.5 °F (36.9 °C) Oral 84 18 100 % 5' 5\" (1.651 m) 130 lb (59 kg)      TEMPERATURE HISTORY 24H: Temp (24hrs), Av.3 °F (36.8 °C), Min:98.1 °F (36.7 °C), Max:98.5 °F (36.9 °C)    BLOOD PRESSURE HISTORY: Systolic (18SBV), OT , Min:93 , RAH:345    Diastolic (35FGC), DIC:07, Min:59, Max:81    Admission Weight: 130 lb (59 kg)       Intake/Output Summary (Last 24 hours) at 2019 1755  Last data filed at 2019 1500  Gross per 24 hour   Intake 142.21 ml   Output --   Net 142.21 ml     Drain/tube Output:         Physical Exam:  CONSTITUTIONAL:  alert, no apparent distress   NEUROLOGIC:  Mental Status Exam:  Level of Alertness:   awake  Orientation:  Oriented to person, place, time  EYES:  sclera clear  HENT:  normocepalic, without obvious abnormality  NECK:  supple, symmetrical, trachea midline  LUNGS:  clear to auscultation  CARDIOVASCULAR:  regular rate and rhythm   ABDOMEN: soft, mild distension, mild mid abdominal tenderness without peritonitis  SKIN:  no bruising or bleeding, normal skin color, texture, turgor and no redness, warmth, or swelling      Labs:    CBC:    Recent Labs     19   WBC 10.1   HGB 13.6   HCT 41.1        BMP:    Recent Labs     19      K 4.0      CO2 24   BUN 15   CREATININE <0.5*   GLUCOSE 124*     Hepatic:   Recent Labs     19   AST 18   ALT 11   BILITOT 0.3   ALKPHOS 69     Amylase:   Recent Labs     19   AMYLASE 129*     Lipase:   Recent Labs     19   LIPASE 40.0     Mag:    No results for input(s): MG in the last 72 hours. Phos:   No results for input(s): PHOS in the last 72 hours. Coags: No results for input(s): INR, APTT in the last 72 hours.     Imaging:  I have personally reviewed the following films:    Ct Abdomen Pelvis W Iv Contrast Additional Contrast? None    Result Date: 5/21/2019  EXAMINATION: CT OF THE ABDOMEN AND PELVIS WITH CONTRAST 5/21/2019 8:19 pm TECHNIQUE: CT of the abdomen and pelvis was performed with the administration of intravenous contrast. Multiplanar reformatted images are provided for review. Dose modulation, iterative reconstruction, and/or weight based adjustment of the mA/kV was utilized to reduce the radiation dose to as low as reasonably achievable. COMPARISON: None. HISTORY: ORDERING SYSTEM PROVIDED HISTORY: left sided abd pain TECHNOLOGIST PROVIDED HISTORY: If patient is on cardiac monitor and/or pulse ox, they may be taken off cardiac monitor and pulse ox, left on O2 if currently on. All monitors reattached when patient returns to room. Additional Contrast?->None Ordering Physician Provided Reason for Exam: Abdominal Pain (pt states she has had abd pain with n/v since 11am today.) Acuity: Acute Type of Exam: Initial FINDINGS: Lower Chest: Lung bases are clear. Organs: There is borderline prominence of the intrahepatic ducts. There is no liver lesion. There is no splenic lesion. Gallbladder distention is noted without stone. There is no pancreatic lesion. Adrenal glands are normal.  Low-density lesion in the left kidney is noted medially on axial image 63. This is a small cyst.  No hydronephrosis is noted. GI/Bowel: Segmental incomplete colonic distention is noted. There is questionable colonic wall thickening. Multiple dilated small bowel loops are noted throughout the abdomen. The distal stomach and duodenum are relatively collapsed. Wall thickening in this distribution would be difficult to exclude, particularly in the duodenum. Pelvis: There are significantly dilated small bowel loops throughout the pelvis. These loops are filled with fluid. There is questionable wall thickening at the terminal ileum. Stool is noted in the rectum. Sigmoid colon is otherwise incompletely distended and difficult to characterize completely.   Low-density in the

## 2019-05-23 ENCOUNTER — APPOINTMENT (OUTPATIENT)
Dept: GENERAL RADIOLOGY | Age: 24
DRG: 389 | End: 2019-05-23
Payer: COMMERCIAL

## 2019-05-23 ENCOUNTER — ANESTHESIA EVENT (OUTPATIENT)
Dept: OPERATING ROOM | Age: 24
DRG: 389 | End: 2019-05-23
Payer: COMMERCIAL

## 2019-05-23 LAB
A/G RATIO: 1.3 (ref 1.1–2.2)
ALBUMIN SERPL-MCNC: 3.6 G/DL (ref 3.4–5)
ALP BLD-CCNC: 53 U/L (ref 40–129)
ALT SERPL-CCNC: 8 U/L (ref 10–40)
ANION GAP SERPL CALCULATED.3IONS-SCNC: 16 MMOL/L (ref 3–16)
APTT: 35.1 SEC (ref 26–36)
AST SERPL-CCNC: 14 U/L (ref 15–37)
BASOPHILS ABSOLUTE: 0 K/UL (ref 0–0.2)
BASOPHILS RELATIVE PERCENT: 0.4 %
BILIRUB SERPL-MCNC: 0.5 MG/DL (ref 0–1)
BUN BLDV-MCNC: 11 MG/DL (ref 7–20)
CALCIUM SERPL-MCNC: 8.3 MG/DL (ref 8.3–10.6)
CHLORIDE BLD-SCNC: 104 MMOL/L (ref 99–110)
CO2: 20 MMOL/L (ref 21–32)
CREAT SERPL-MCNC: 0.6 MG/DL (ref 0.6–1.1)
EOSINOPHILS ABSOLUTE: 0.1 K/UL (ref 0–0.6)
EOSINOPHILS RELATIVE PERCENT: 2.2 %
GFR AFRICAN AMERICAN: >60
GFR NON-AFRICAN AMERICAN: >60
GLOBULIN: 2.7 G/DL
GLUCOSE BLD-MCNC: 85 MG/DL (ref 70–99)
HCT VFR BLD CALC: 35.6 % (ref 36–48)
HEMOGLOBIN: 11.7 G/DL (ref 12–16)
INR BLD: 1.12 (ref 0.86–1.14)
LACTIC ACID: 0.9 MMOL/L (ref 0.4–2)
LYMPHOCYTES ABSOLUTE: 0.9 K/UL (ref 1–5.1)
LYMPHOCYTES RELATIVE PERCENT: 21.1 %
MAGNESIUM: 1.7 MG/DL (ref 1.8–2.4)
MCH RBC QN AUTO: 30.2 PG (ref 26–34)
MCHC RBC AUTO-ENTMCNC: 33 G/DL (ref 31–36)
MCV RBC AUTO: 91.4 FL (ref 80–100)
MONOCYTES ABSOLUTE: 0.7 K/UL (ref 0–1.3)
MONOCYTES RELATIVE PERCENT: 16.9 %
NEUTROPHILS ABSOLUTE: 2.5 K/UL (ref 1.7–7.7)
NEUTROPHILS RELATIVE PERCENT: 59.4 %
PDW BLD-RTO: 16.3 % (ref 12.4–15.4)
PHOSPHORUS: 3.7 MG/DL (ref 2.5–4.9)
PLATELET # BLD: 209 K/UL (ref 135–450)
PMV BLD AUTO: 10.2 FL (ref 5–10.5)
POTASSIUM SERPL-SCNC: 3.6 MMOL/L (ref 3.5–5.1)
PREALBUMIN: 14.8 MG/DL (ref 20–40)
PROTHROMBIN TIME: 12.8 SEC (ref 9.8–13)
RBC # BLD: 3.9 M/UL (ref 4–5.2)
SODIUM BLD-SCNC: 140 MMOL/L (ref 136–145)
TOTAL PROTEIN: 6.3 G/DL (ref 6.4–8.2)
TRANSFERRIN: 236 MG/DL (ref 200–360)
WBC # BLD: 4.2 K/UL (ref 4–11)

## 2019-05-23 PROCEDURE — 96376 TX/PRO/DX INJ SAME DRUG ADON: CPT

## 2019-05-23 PROCEDURE — 6360000002 HC RX W HCPCS: Performed by: INTERNAL MEDICINE

## 2019-05-23 PROCEDURE — APPSS15 APP SPLIT SHARED TIME 0-15 MINUTES: Performed by: NURSE PRACTITIONER

## 2019-05-23 PROCEDURE — 2500000003 HC RX 250 WO HCPCS: Performed by: INTERNAL MEDICINE

## 2019-05-23 PROCEDURE — 84100 ASSAY OF PHOSPHORUS: CPT

## 2019-05-23 PROCEDURE — 85025 COMPLETE CBC W/AUTO DIFF WBC: CPT

## 2019-05-23 PROCEDURE — 85610 PROTHROMBIN TIME: CPT

## 2019-05-23 PROCEDURE — 36415 COLL VENOUS BLD VENIPUNCTURE: CPT

## 2019-05-23 PROCEDURE — 80053 COMPREHEN METABOLIC PANEL: CPT

## 2019-05-23 PROCEDURE — 84134 ASSAY OF PREALBUMIN: CPT

## 2019-05-23 PROCEDURE — 96365 THER/PROPH/DIAG IV INF INIT: CPT

## 2019-05-23 PROCEDURE — G0378 HOSPITAL OBSERVATION PER HR: HCPCS

## 2019-05-23 PROCEDURE — APPNB30 APP NON BILLABLE TIME 0-30 MINS: Performed by: NURSE PRACTITIONER

## 2019-05-23 PROCEDURE — 99232 SBSQ HOSP IP/OBS MODERATE 35: CPT | Performed by: SURGERY

## 2019-05-23 PROCEDURE — 2580000003 HC RX 258: Performed by: INTERNAL MEDICINE

## 2019-05-23 PROCEDURE — 83735 ASSAY OF MAGNESIUM: CPT

## 2019-05-23 PROCEDURE — 74019 RADEX ABDOMEN 2 VIEWS: CPT

## 2019-05-23 PROCEDURE — 85730 THROMBOPLASTIN TIME PARTIAL: CPT

## 2019-05-23 PROCEDURE — 1200000000 HC SEMI PRIVATE

## 2019-05-23 PROCEDURE — 83605 ASSAY OF LACTIC ACID: CPT

## 2019-05-23 PROCEDURE — 84466 ASSAY OF TRANSFERRIN: CPT

## 2019-05-23 RX ORDER — CIPROFLOXACIN 2 MG/ML
400 INJECTION, SOLUTION INTRAVENOUS EVERY 12 HOURS
Status: DISCONTINUED | OUTPATIENT
Start: 2019-05-23 | End: 2019-05-26

## 2019-05-23 RX ADMIN — METRONIDAZOLE 500 MG: 500 INJECTION, SOLUTION INTRAVENOUS at 11:53

## 2019-05-23 RX ADMIN — FAMOTIDINE 20 MG: 10 INJECTION, SOLUTION INTRAVENOUS at 09:08

## 2019-05-23 RX ADMIN — CIPROFLOXACIN 400 MG: 2 INJECTION, SOLUTION INTRAVENOUS at 22:53

## 2019-05-23 RX ADMIN — HYDROMORPHONE HYDROCHLORIDE 0.5 MG: 1 INJECTION, SOLUTION INTRAMUSCULAR; INTRAVENOUS; SUBCUTANEOUS at 11:48

## 2019-05-23 RX ADMIN — HYDROMORPHONE HYDROCHLORIDE 0.5 MG: 1 INJECTION, SOLUTION INTRAMUSCULAR; INTRAVENOUS; SUBCUTANEOUS at 17:47

## 2019-05-23 RX ADMIN — CIPROFLOXACIN 400 MG: 2 INJECTION, SOLUTION INTRAVENOUS at 09:18

## 2019-05-23 RX ADMIN — ONDANSETRON 4 MG: 2 INJECTION INTRAMUSCULAR; INTRAVENOUS at 07:02

## 2019-05-23 RX ADMIN — METRONIDAZOLE 500 MG: 500 INJECTION, SOLUTION INTRAVENOUS at 20:46

## 2019-05-23 RX ADMIN — Medication 10 ML: at 09:08

## 2019-05-23 RX ADMIN — HYDROMORPHONE HYDROCHLORIDE 0.5 MG: 1 INJECTION, SOLUTION INTRAMUSCULAR; INTRAVENOUS; SUBCUTANEOUS at 22:51

## 2019-05-23 RX ADMIN — HYDROMORPHONE HYDROCHLORIDE 0.5 MG: 1 INJECTION, SOLUTION INTRAMUSCULAR; INTRAVENOUS; SUBCUTANEOUS at 06:58

## 2019-05-23 ASSESSMENT — PAIN DESCRIPTION - ONSET
ONSET: ON-GOING
ONSET: ON-GOING

## 2019-05-23 ASSESSMENT — PAIN DESCRIPTION - PROGRESSION
CLINICAL_PROGRESSION: NOT CHANGED
CLINICAL_PROGRESSION: GRADUALLY IMPROVING

## 2019-05-23 ASSESSMENT — PAIN SCALES - GENERAL
PAINLEVEL_OUTOF10: 7
PAINLEVEL_OUTOF10: 2
PAINLEVEL_OUTOF10: 7
PAINLEVEL_OUTOF10: 7
PAINLEVEL_OUTOF10: 9
PAINLEVEL_OUTOF10: 8

## 2019-05-23 ASSESSMENT — PAIN DESCRIPTION - ORIENTATION
ORIENTATION: LOWER
ORIENTATION: LOWER

## 2019-05-23 ASSESSMENT — PAIN DESCRIPTION - DESCRIPTORS
DESCRIPTORS: DULL
DESCRIPTORS: DULL

## 2019-05-23 ASSESSMENT — PAIN DESCRIPTION - FREQUENCY
FREQUENCY: CONTINUOUS
FREQUENCY: CONTINUOUS

## 2019-05-23 ASSESSMENT — PAIN DESCRIPTION - PAIN TYPE
TYPE: ACUTE PAIN
TYPE: ACUTE PAIN

## 2019-05-23 ASSESSMENT — PAIN DESCRIPTION - LOCATION
LOCATION: ABDOMEN
LOCATION: ABDOMEN

## 2019-05-23 NOTE — PROGRESS NOTES
Hospitalist Progress Note      [facility]    Date of Admission: 5/21/2019    LOS: 0    Chief Complaint:   Chief Complaint   Patient presents with    Abdominal Pain     pt states she has had abd pain with n/v since 11am today. Case Summary:   64-year-old lady with no significant past medical history who presented with complaints of mid abdominal pain of acute onset with associated nausea and vomiting in the setting of her normal bowel motion on the day of admission. CT of the abdomen was concerning for probable terminal ileitis and small bowel obstruction. Active Hospital Problems    Diagnosis Date Noted    SBO (small bowel obstruction) ([address] [K56.609] 05/22/2019    Ileitis, terminal ([address] [K50.00] 05/22/2019    Abnormal CT of the abdomen [R93.5]     Abdominal pain [R10.9] 05/21/2019         Principal Problem:    SBO (small bowel obstruction) ([address]  Active Problems:    Abdominal pain    Ileitis, terminal (HCC)    Abnormal CT of the abdomen  Resolved Problems:    * No resolved hospital problems. *    Reports abdominal pains last night. Had small bowel follow-through noted for distended distal small bowel loops with delayed contrast in the right colon. Repeat KUB was noted for irregular narrowing of the lumen corresponding with at least moderate to minimal ileitis as seen on CT with wall thickening and increased mucosal enhancement.   Repeat abdominal x-ray this morning did not show any significant change consistent with partial small bowel obstruction however the contrast was noted to extend into the descending colon.    -Continue conservative therapy  -Pain management with when necessary IV narcotics  -Continue antibiotic coverage with ciprofloxacin and Flagyl  -NG tube plan for placement this morning feeling which surgical approach may be warranted  -GI and general surgery following with recommendations next line-continue on IV fluids for hydration      Medications: Reviewed  Infusion Medications    sodium chloride 100 mL/hr at 05/22/19 1208     Scheduled Medications    ciprofloxacin  400 mg Intravenous Q12H    metroNIDAZOLE  500 mg Intravenous Q8H    sodium chloride flush  10 mL Intravenous 2 times per day    enoxaparin  40 mg Subcutaneous Nightly    famotidine (PEPCID) injection  20 mg Intravenous BID     PRN Meds: phenol, HYDROmorphone, sodium chloride flush, magnesium hydroxide, ondansetron, acetaminophen      DVT Prophylaxis: Subcut in enoxaparin  Diet: Diet NPO Effective Now Exceptions are: Sips with Meds  Code Status: Full Code    Dispo: Anticipate discharge in the next 72 hrs    ____________________________________________________________________________    Subjective:   Overnight Events:   Continued abdominal pains this morning      Physical Exam Performed:  BP (!) 92/58   Pulse 91   Temp 99.2 °F (37.3 °C) (Oral)   Resp 16   Ht 5' 5\" (1.651 m)   Wt 130 lb (59 kg)   LMP 05/21/2019   SpO2 100%   BMI 21.63 kg/m²   General appearance: No apparent distress, appears stated age and cooperative. HEENT: Normocephalic, atraumatic, MMM, No sclera icterus/conjuctival palor  Neck: Supple, no thyromegally. No jugular venous distention. Respiratory:  Normal respiratory effort. Clear to auscultation, no Rales/Wheezes/Rhonchi. Cardiovascular: S1/S2 without murmurs, rubs or gallops. RRR  Abdomen: Soft, mild periumbilical tenderness with diminished bowel sounds, non-distended  Musculoskeletal: No clubbing, cyanosis or edema bilaterally. Skin: Skin color, texture, turgor normal.  No rashes or lesions.   Neurologic:  Cranial nerves: II-XII intact, ISABEL, No focal sensory/motor deficits  Psychiatric: Alert and oriented, thought content appropriate  Capillary Refill: Brisk,< 3 seconds   Peripheral Pulses: +2 palpable, equal bilaterally       Intake/Output Summary (Last 24 hours) at 5/23/2019 1300  Last data filed at 5/23/2019 0700  Gross per 24 hour   Intake 1473.21 ml Output --   Net 1473.21 ml       Labs:   Recent Labs     05/21/19  1855 05/23/19  0710   WBC 10.1 4.2   HGB 13.6 11.7*   HCT 41.1 35.6*    209      Recent Labs     05/21/19  1855 05/23/19  0710    140   K 4.0 3.6    104   CO2 24 20*   BUN 15 11   CREATININE <0.5* 0.6   CALCIUM 9.7 8.3   PHOS  --  3.7   AST 18 14*   ALT 11 8*   BILITOT 0.3 0.5   ALKPHOS 69 53     No results for input(s): CKTOTAL, TROPONINI in the last 72 hours. Urinalysis:    Lab Results   Component Value Date    NITRU Negative 05/21/2019    WBCUA 6 05/21/2019    BACTERIA 1+ 05/21/2019    RBCUA 4 05/21/2019    BLOODU Negative 05/21/2019    SPECGRAV >1.030 05/21/2019    GLUCOSEU Negative 05/21/2019       Radiology:  XR ABDOMEN (2 VIEWS)   Final Result   No significant change in findings consistent with partial small bowel   obstruction. XR ABDOMEN (KUB) (SINGLE AP VIEW)   Final Result   Dilation of small bowel loops throughout the abdomen, also present on prior   studies 10 yesterday, to the level of the distal most ileum where there is   irregular narrowing of the lumen corresponding with at least moderate   terminal ileitis seen on CT with wall thickening and increased mucosal   enhancement. Dilation of the small bowel may be related to partial   obstruction and/or ileus. FL SMALL BOWEL FOLLOW THROUGH ONLY   Final Result   Subtotal colectomy history is noted. Distal small bowel loops are prominently dilated. No passage into the rectal   region is identified. More delayed follow-up abdominal x-ray is planned at 10 p.m. [facility]. CT ABDOMEN PELVIS W IV CONTRAST Additional Contrast? None   Final Result   Disproportionate small bowel distention throughout the entire abdomen and the   pelvis, greatest in the pelvis. Disproportionate nature raises the question   of a small bowel obstruction. There is questionable wall thickening at the   terminal ileal level.   This raises the question of terminal ileitis. There is also multifocal colonic incomplete distention. Additional   thickening in the colon would be difficult to exclude.                  Hernan Webb MD

## 2019-05-23 NOTE — PROGRESS NOTES
[facility] GI  Gastroenterology Progress Note  Estella Rizzo is a 30 y.o. female patient. 1. Abnormal CT of the abdomen    2. Nausea and vomiting in adult    3. Abdominal pain, unspecified abdominal location        SUBJECTIVE:   Still with mild distention. No bm. No n/v.  Pain still present. Small flatus. Physical    VITALS:  BP (!) 92/58   Pulse 91   Temp 99.2 °F (37.3 °C) (Oral)   Resp 16   Ht 5' 5\" (1.651 m)   Wt 130 lb (59 kg)   LMP 2019   SpO2 100%   BMI 21.63 kg/m²   TEMPERATURE:  Current - Temp: 99.2 °F (37.3 °C); Max - Temp  Av.9 °F (37.2 °C)  Min: 98.4 °F (36.9 °C)  Max: 99.4 °F (37.4 °C)    Abdomen soft, mild distention. Mild ttp, no HSM, Bowel sounds normal   rrr nl s1s2  No edema  nad  cta bilat nl effort. Data      Recent Labs     19  0710   WBC 10.1 4.2   HGB 13.6 11.7*   HCT 41.1 35.6*   MCV 90.4 91.4    209     Recent Labs     19  0710    140   K 4.0 3.6    104   CO2 24 20*   PHOS  --  3.7   BUN 15 11   CREATININE <0.5* 0.6     Recent Labs     19  0710   AST 18 14*   ALT 11 8*   BILITOT 0.3 0.5   ALKPHOS 69 53     Recent Labs     19   LIPASE 40.0   AMYLASE 129*             ASSESSMENT  1. PSBO at the TI. Essentially unchanged. Suspect she may have ileitis (crohn's?). PLAN    1. Will give abx, needs NG tube. If not improved may need lap tomorrow.      Yamile Aguilar MD  [address]

## 2019-05-23 NOTE — PROGRESS NOTES
Shift assessment complete. Vital signs stable. Abdomen soft but distended. Reports pain in left lower quadrant. Medicated per [facility] & night meds administered. Pt informed that radiology will be in around 2200 for f/u abdominal xray. The care plan and education has been reviewed and mutually agreed upon with the patient. Independent in room. All needs met at this time. Will continue to monitor.

## 2019-05-23 NOTE — PROGRESS NOTES
[facility]        Progress Note    Patient Name: Sd Leal  MRN: [id number]  YOB: 1995  Date of Evaluation: 2019    Chief Complaint: Abdominal pain    Subjective:  No acute events overnight  Pain returned yesterday, patient describes it as severe as it was when she initially presented by more intermittent as opposed to constant  No nausea or vomiting  Remains bloated  No flatus or BM      Vital Signs:  Patient Vitals for the past 24 hrs:   BP Temp Temp src Pulse Resp SpO2   19 1319 94/65 98.2 °F (36.8 °C) Axillary 89 16 97 %   19 0900 (!) 92/58 99.2 °F (37.3 °C) Oral 91 16 100 %   19 0600 (!) 99/58 98.5 °F (36.9 °C) Oral 70 16 95 %   19 0030 93/60 99.4 °F (37.4 °C) Temporal 73 16 98 %   19 2115 108/73 98.4 °F (36.9 °C) Oral 107 16 100 %      TEMPERATURE HISTORY 24H: Temp (24hrs), Av.7 °F (37.1 °C), Min:98.2 °F (36.8 °C), Max:99.4 °F (37.4 °C)    BLOOD PRESSURE HISTORY: Systolic (23KZJ), OPV:04 , Min:92 , SDO:570    Diastolic (98XZL), VSJ:15, Min:58, Max:73      Intake/Output:  I/O last 3 completed shifts: In: 1473.2 [I.V.:1473.2]  Out: -   No intake/output data recorded.   Drain/tube Output:       Physical Exam:  General: awake, alert, oriented to  person, place, time  Lungs: clear to auscultation  Heart: RRR  Abdomen: soft, mild supraumbilical tenderness only, mildly distended, bowel sounds present   SKIN:  no bruising or bleeding and normal skin color, texture, turgor      Labs:  CBC:    Recent Labs     19  1855 19  0710   WBC 10.1 4.2   HGB 13.6 11.7*   HCT 41.1 35.6*    209     BMP:    Recent Labs     19  0710    140   K 4.0 3.6    104   CO2 24 20*   BUN 15 11   CREATININE <0.5* 0.6   GLUCOSE 124* 85     Hepatic:   Recent Labs     19  0710   AST 18 14*   ALT 11 8*   BILITOT 0.3 0.5   ALKPHOS 69 53     Amylase:   Recent Labs 05/21/19  1855   AMYLASE 129*     Lipase:   Recent Labs     05/21/19  1855   LIPASE 40.0     Mag:      Recent Labs     05/23/19  0710   MG 1.70*      Phos:     Recent Labs     05/23/19  0710   PHOS 3.7      Coags:   Recent Labs     05/23/19  0710   INR 1.12   APTT 35.1       Cultures:  Anaerobic culture  No results for input(s): LABANAE in the last 72 hours. Blood culture  No results for input(s): BC in the last 72 hours. Blood culture 2  No results for input(s): BLOODCULT2 in the last 72 hours. Body fluid culture  No results for input(s): BLOODCULT2 in the last 72 hours. Surgical culture  No results for input(s): CXSURG in the last 72 hours. Fecal occult  No results for input(s): OCCULTBLDFEC in the last 72 hours. Gram stain  No results for input(s): LABGRAM in the last 72 hours. Stool culture 1  No results for input(s): CXST in the last 72 hours. Stool culture 2  No results for input(s): STOOLCULT2 in the last 72 hours. Urine culture  No results for input(s): LABURIN in the last 72 hours. Wound abscess  No results for input(s): WNDABS in the last 72 hours. Pathology:  NA    Imaging:  I have personally reviewed the following films:    Xr Abdomen (kub) (single Ap View)    Result Date: 5/23/2019  EXAMINATION: ONE SUPINE XRAY VIEW(S) OF THE ABDOMEN 5/22/2019 9:52 pm COMPARISON: 05/22/2019 at 1519 hours, small-bowel follow-through today hours HISTORY: ORDERING SYSTEM PROVIDED HISTORY: r/o sbo TECHNOLOGIST PROVIDED HISTORY: Reason for exam:->r/o sbo Ordering Physician Provided Reason for Exam: F/u SBFT. Acuity: Acute Type of Exam: Subsequent/Follow-up FINDINGS: There is dilation of small bowel throughout the abdomen and pelvis; the distal most small bowel is more prominently dilated, also seen on CT yesterday measuring up to 7 cm in caliber. There is abrupt transition of caliber at the terminal ileum which shows irregular narrowing.  On the current study, there is some contrast within the right colon. No contrast is seen within the visualized colon or rectosigmoid colon. Dilation of small bowel loops throughout the abdomen, also present on prior studies 10 yesterday, to the level of the distal most ileum where there is irregular narrowing of the lumen corresponding with at least moderate terminal ileitis seen on CT with wall thickening and increased mucosal enhancement. Dilation of the small bowel may be related to partial obstruction and/or ileus. Ct Abdomen Pelvis W Iv Contrast Additional Contrast? None    Result Date: 5/21/2019  EXAMINATION: CT OF THE ABDOMEN AND PELVIS WITH CONTRAST 5/21/2019 8:19 pm TECHNIQUE: CT of the abdomen and pelvis was performed with the administration of intravenous contrast. Multiplanar reformatted images are provided for review. Dose modulation, iterative reconstruction, and/or weight based adjustment of the mA/kV was utilized to reduce the radiation dose to as low as reasonably achievable. COMPARISON: None. HISTORY: ORDERING SYSTEM PROVIDED HISTORY: left sided abd pain TECHNOLOGIST PROVIDED HISTORY: If patient is on cardiac monitor and/or pulse ox, they may be taken off cardiac monitor and pulse ox, left on O2 if currently on. All monitors reattached when patient returns to room. Additional Contrast?->None Ordering Physician Provided Reason for Exam: Abdominal Pain (pt states she has had abd pain with n/v since 11am today.) Acuity: Acute Type of Exam: Initial FINDINGS: Lower Chest: Lung bases are clear. Organs: There is borderline prominence of the intrahepatic ducts. There is no liver lesion. There is no splenic lesion. Gallbladder distention is noted without stone. There is no pancreatic lesion. Adrenal glands are normal.  Low-density lesion in the left kidney is noted medially on axial image 63. This is a small cyst.  No hydronephrosis is noted. GI/Bowel: Segmental incomplete colonic distention is noted.   There is questionable colonic wall stomach is unremarkable. The proximal small bowel loops are relatively decompressed. However the mid and distal small bowel loops are severely distended. No contrast is seen to the colon by 3-1/2 hours following the initial ingestion of barium. Subtotal colectomy history is noted. Distal small bowel loops are prominently dilated. No passage into the rectal region is identified. More delayed follow-up abdominal x-ray is planned at 10 p.m. tonight. Xr Abdomen (2 Views)    Result Date: 5/23/2019  EXAMINATION: TWO XRAY VIEWS OF THE ABDOMEN 5/23/2019 8:24 am COMPARISON: Radiograph 05/22/2019 HISTORY: ORDERING SYSTEM PROVIDED HISTORY: small bowel obstruction TECHNOLOGIST PROVIDED HISTORY: Reason for exam:->small bowel obstruction FINDINGS: No significant change in distention of small-bowel loops. Contrast again extends to the descending colon. No acute osseous abnormality. No significant change in findings consistent with partial small bowel obstruction. Scheduled Meds:   ciprofloxacin  400 mg Intravenous Q12H    metroNIDAZOLE  500 mg Intravenous Q8H    sodium chloride flush  10 mL Intravenous 2 times per day    enoxaparin  40 mg Subcutaneous Nightly    famotidine (PEPCID) injection  20 mg Intravenous BID     Continuous Infusions:   sodium chloride 100 mL/hr at 05/22/19 1208     PRN Meds:.phenol, HYDROmorphone, sodium chloride flush, magnesium hydroxide, ondansetron, acetaminophen      Assessment:  30 y.o. female admitted with   1. Abnormal CT of the abdomen    2. Nausea and vomiting in adult    3. Abdominal pain, unspecified abdominal location        Small bowel obstruction      Plan:  1. Increased pain as day progressed yesterday, remains bloated without bowel function; place NGT to ILWS; if no significant improvement by tomorrow morning with plan for surgical exploration  2. NPO diet  3. IV hydration  4. Activity as tolerated, ambulate TID  5. Pulmonary toilet, incentive spirometry  6.  PRN analgesics and antiemetics--minimizing narcotics as tolerated  7. DVT prophylaxis with Lovenox  8. Management of medical comorbid etiologies per primary team and consulting services    EDUCATION:  Educated patient on plan of care and disease process--all questions answered. Plans discussed with patient and nursing. Reviewed and discussed with Dr. Shalini Hollingsworth. Signed:  Tray Ragsdale, APRN - CNP  5/23/2019 2:57 PM    I have personally performed a face to face diagnostic evaluation on this patient. I have interviewed and examined the patient and I agree with the assessment above. In summary, my findings and plan are the following:   Ms. Maricruz William is a 30 y.o. female who presents with   Small bowel obstruction     Plan:  1. The patient has a small bowel obstruction that is likely from adhesive disease vs infectious terminal ileitis. Small bowel follow through consistent with partial small bowel obstruction. NG placed, if not significantly better tomorrow will likely need diagnostic laparoscopy, lysis of adhesions, possible laparotomy, possible bowel resection  2. Bowel rest, NG decompression  3. IVF resuscitation  4. Monitor leukocytosis, antibiotics  5. Pain medication and antiemetics as needed with caution as they may mask a worsening exam  6. Close monitoring of electrolytes, electrolyte derangement can alter bowel function    Yaya Hollingsworth MD, FACS  5/23/2019  5:56 PM

## 2019-05-23 NOTE — PROGRESS NOTES
0730: Pt resting in bed, no s/s of distress. Shift handoff completed with Cathleen at bedside. 1030: NG inserted in right nostril. Pt tolerated fairly well. Attached to wall suction. Pt educated on NG care and calling out when ambulating. Pt verbalizes understanding. 1123: pt medicated for pain. 1330 : Pt resting in bed, family at bedside. Pt denies needs. Ng to suction without complications. 1700: Pt up walking x 1 assist tolerated well. 1745: Pt sitting up in chair, medicated for pain. Pt states pain is decreasing since this am. Reports passing flatus.

## 2019-05-23 NOTE — PLAN OF CARE
Problem: Pain:  Goal: Pain level will decrease  Description  Pain level will decrease  5/23/2019 0302 by Calvin Rothman RN  Outcome: Met This Shift  5/23/2019 0302 by Calvin Rothman RN  Outcome: Met This Shift  Goal: Control of acute pain  Description  Control of acute pain  5/23/2019 0302 by Calvin Rothman RN  Outcome: Met This Shift  5/23/2019 0302 by Calvin Rothman RN  Outcome: Met This Shift  Goal: Control of chronic pain  Description  Control of chronic pain  5/23/2019 0302 by Calvin Rothman RN  Outcome: Met This Shift  5/23/2019 0302 by Calvin Rothman RN  Outcome: Met This Shift     Problem:  Bowel/Gastric:  Goal: Bowel function will improve  Description  Bowel function will improve  5/23/2019 0302 by Calvin Rothman RN  Outcome: Ongoing  5/23/2019 0302 by Calvin Rothman RN  Outcome: Met This Shift  Goal: Diagnostic test results will improve  Description  Diagnostic test results will improve  5/23/2019 0302 by Calvin Rothman RN  Outcome: Ongoing  5/23/2019 0302 by Calvin Rothman RN  Outcome: Met This Shift  Goal: Occurrences of nausea will decrease  Description  Occurrences of nausea will decrease  5/23/2019 0302 by Calvin Rothman RN  Outcome: Ongoing  5/23/2019 0302 by Calvin Rothman RN  Outcome: Met This Shift  Goal: Occurrences of vomiting will decrease  Description  Occurrences of vomiting will decrease  5/23/2019 0302 by Calvin Rothman RN  Outcome: Ongoing  5/23/2019 0302 by Calvin Rothman RN  Outcome: Met This Shift

## 2019-05-23 NOTE — ANESTHESIA PRE PROCEDURE
Department of Anesthesiology  Preprocedure Note       Name:  Zahira Staples   Age:  30 y.o.  :  1995                                          MRN:  [id number]         Date:  2019      Surgeon: Porsha Canada):  Chino Gurrola MD    Procedure: DIAGNOSTIC LAPAROSCOPY, LYSIS OF ADHESIONS (N/A )  POSSIBLE LAPAROTOMY (N/A Abdomen)    Medications prior to admission:   Prior to Admission medications    Not on File       Current medications:    Current Facility-Administered Medications   Medication Dose Route Frequency Provider Last Rate Last Dose    ciprofloxacin (CIPRO) IVPB 400 mg  400 mg Intravenous Q12H Anahi Celis MD   Stopped at 19 1018    metronidazole (FLAGYL) 500 mg in NaCl 100 mL IVPB premix  500 mg Intravenous Q8H Anahi Celis  mL/hr at 19 1153 500 mg at 19 1153    phenol 1.4 % mouth spray 1 spray  1 spray Mouth/Throat Q2H PRN Kamryn Every, APRN - CNP        0.9 % sodium chloride infusion   Intravenous Continuous Roberto Pond  mL/hr at 19 1208      HYDROmorphone HCl PF (DILAUDID) injection 0.5 mg  0.5 mg Intravenous Q4H PRN Bud Cochran MD   0.5 mg at 19 1148    sodium chloride flush 0.9 % injection 10 mL  10 mL Intravenous 2 times per day Yfn Dunbar MD   10 mL at 19 0908    sodium chloride flush 0.9 % injection 10 mL  10 mL Intravenous PRN Yfn Dunbar MD   10 mL at 19 0911    magnesium hydroxide (MILK OF MAGNESIA) 400 MG/5ML suspension 30 mL  30 mL Oral Daily PRN Yfn Dunbar MD        ondansetron (ZOFRAN) injection 4 mg  4 mg Intravenous Q6H PRN Yfn Dunbar MD   4 mg at 19 0702    enoxaparin (LOVENOX) injection 40 mg  40 mg Subcutaneous Nightly Yfn Dunbar MD        acetaminophen (TYLENOL) tablet 650 mg  650 mg Oral Q4H PRN Yfn Dunbar MD        famotidine (PEPCID) injection 20 mg  20 mg Intravenous BID Yfn Dunbar MD   20 mg at 19 0908       Allergies:  No Known Allergies    Problem List:    Patient Active Problem List   Diagnosis Code    Abdominal pain R10.9    [facility] K56.609    Ileitis, terminal ([facility] K50.00    Abnormal CT of the abdomen R93.5       Past Medical History:        Diagnosis Date    Asthma        Past Surgical History:        Procedure Laterality Date    APPENDECTOMY  2017       Social History:    Social History     Tobacco Use    Smoking status: Never Smoker   Substance Use Topics    Alcohol use: Yes                                Counseling given: Not Answered      Vital Signs (Current):   Vitals:    05/22/19 2115 05/23/19 0030 05/23/19 0600 05/23/19 0900   BP: 108/73 93/60 (!) 99/58 (!) 92/58   Pulse: 107 73 70 91   Resp: 16 16 16 16   Temp: 36.9 °C (98.4 °F) 37.4 °C (99.4 °F) 36.9 °C (98.5 °F) 37.3 °C (99.2 °F)   TempSrc: Oral Temporal Oral Oral   SpO2: 100% 98% 95% 100%   Weight:       Height:                                                  BP Readings from Last 3 Encounters:   05/23/19 (!) 92/58   03/24/16 111/53       NPO Status:                                                                                 BMI:   Wt Readings from Last 3 Encounters:   05/21/19 130 lb (59 kg)   03/24/16 124 lb (56.2 kg)     Body mass index is 21.63 kg/m².     CBC:   Lab Results   Component Value Date    WBC 4.2 05/23/2019    RBC 3.90 05/23/2019    HGB 11.7 05/23/2019    HCT 35.6 05/23/2019    MCV 91.4 05/23/2019    RDW 16.3 05/23/2019     05/23/2019       CMP:   Lab Results   Component Value Date     05/23/2019    K 3.6 05/23/2019     05/23/2019    CO2 20 05/23/2019    BUN 11 05/23/2019    CREATININE 0.6 05/23/2019    GFRAA >60 05/23/2019    AGRATIO 1.3 05/23/2019    LABGLOM >60 05/23/2019    GLUCOSE 85 05/23/2019    PROT 6.3 05/23/2019    CALCIUM 8.3 05/23/2019    BILITOT 0.5 05/23/2019    ALKPHOS 53 05/23/2019    AST 14 05/23/2019    ALT 8 05/23/2019       POC Tests: No results for input(s): POCGLU, POCNA, POCK, POCCL, POCBUN,

## 2019-05-24 ENCOUNTER — APPOINTMENT (OUTPATIENT)
Dept: GENERAL RADIOLOGY | Age: 24
DRG: 389 | End: 2019-05-24
Payer: COMMERCIAL

## 2019-05-24 ENCOUNTER — ANESTHESIA (OUTPATIENT)
Dept: OPERATING ROOM | Age: 24
DRG: 389 | End: 2019-05-24
Payer: COMMERCIAL

## 2019-05-24 PROBLEM — E83.42 HYPOMAGNESEMIA: Status: ACTIVE | Noted: 2019-05-24

## 2019-05-24 PROBLEM — E87.6 HYPOKALEMIA: Status: ACTIVE | Noted: 2019-05-24

## 2019-05-24 LAB
ANION GAP SERPL CALCULATED.3IONS-SCNC: 12 MMOL/L (ref 3–16)
BASOPHILS ABSOLUTE: 0 K/UL (ref 0–0.2)
BASOPHILS RELATIVE PERCENT: 0.5 %
BUN BLDV-MCNC: 5 MG/DL (ref 7–20)
CALCIUM SERPL-MCNC: 8.5 MG/DL (ref 8.3–10.6)
CHLORIDE BLD-SCNC: 107 MMOL/L (ref 99–110)
CO2: 20 MMOL/L (ref 21–32)
CREAT SERPL-MCNC: 0.5 MG/DL (ref 0.6–1.1)
EOSINOPHILS ABSOLUTE: 0.2 K/UL (ref 0–0.6)
EOSINOPHILS RELATIVE PERCENT: 7 %
GFR AFRICAN AMERICAN: >60
GFR NON-AFRICAN AMERICAN: >60
GLUCOSE BLD-MCNC: 76 MG/DL (ref 70–99)
HCG(URINE) PREGNANCY TEST: NEGATIVE
HCT VFR BLD CALC: 34.4 % (ref 36–48)
HEMOGLOBIN: 11.4 G/DL (ref 12–16)
LACTIC ACID: 1 MMOL/L (ref 0.4–2)
LYMPHOCYTES ABSOLUTE: 0.6 K/UL (ref 1–5.1)
LYMPHOCYTES RELATIVE PERCENT: 22.7 %
MAGNESIUM: 1.6 MG/DL (ref 1.8–2.4)
MCH RBC QN AUTO: 29.9 PG (ref 26–34)
MCHC RBC AUTO-ENTMCNC: 33.2 G/DL (ref 31–36)
MCV RBC AUTO: 90.1 FL (ref 80–100)
MONOCYTES ABSOLUTE: 0.5 K/UL (ref 0–1.3)
MONOCYTES RELATIVE PERCENT: 19.4 %
NEUTROPHILS ABSOLUTE: 1.3 K/UL (ref 1.7–7.7)
NEUTROPHILS RELATIVE PERCENT: 50.4 %
PDW BLD-RTO: 16 % (ref 12.4–15.4)
PHOSPHORUS: 3.1 MG/DL (ref 2.5–4.9)
PLATELET # BLD: 207 K/UL (ref 135–450)
PMV BLD AUTO: 9.9 FL (ref 5–10.5)
POTASSIUM SERPL-SCNC: 3.4 MMOL/L (ref 3.5–5.1)
RBC # BLD: 3.81 M/UL (ref 4–5.2)
SODIUM BLD-SCNC: 139 MMOL/L (ref 136–145)
WBC # BLD: 2.6 K/UL (ref 4–11)

## 2019-05-24 PROCEDURE — 2580000003 HC RX 258: Performed by: INTERNAL MEDICINE

## 2019-05-24 PROCEDURE — 84100 ASSAY OF PHOSPHORUS: CPT

## 2019-05-24 PROCEDURE — APPNB30 APP NON BILLABLE TIME 0-30 MINS: Performed by: NURSE PRACTITIONER

## 2019-05-24 PROCEDURE — 84703 CHORIONIC GONADOTROPIN ASSAY: CPT

## 2019-05-24 PROCEDURE — 6360000002 HC RX W HCPCS: Performed by: INTERNAL MEDICINE

## 2019-05-24 PROCEDURE — 80048 BASIC METABOLIC PNL TOTAL CA: CPT

## 2019-05-24 PROCEDURE — 99232 SBSQ HOSP IP/OBS MODERATE 35: CPT | Performed by: SURGERY

## 2019-05-24 PROCEDURE — 2500000003 HC RX 250 WO HCPCS: Performed by: INTERNAL MEDICINE

## 2019-05-24 PROCEDURE — APPSS15 APP SPLIT SHARED TIME 0-15 MINUTES: Performed by: NURSE PRACTITIONER

## 2019-05-24 PROCEDURE — 83605 ASSAY OF LACTIC ACID: CPT

## 2019-05-24 PROCEDURE — 74019 RADEX ABDOMEN 2 VIEWS: CPT

## 2019-05-24 PROCEDURE — 83735 ASSAY OF MAGNESIUM: CPT

## 2019-05-24 PROCEDURE — 1200000000 HC SEMI PRIVATE

## 2019-05-24 PROCEDURE — 36415 COLL VENOUS BLD VENIPUNCTURE: CPT

## 2019-05-24 PROCEDURE — 85025 COMPLETE CBC W/AUTO DIFF WBC: CPT

## 2019-05-24 RX ORDER — MAGNESIUM SULFATE IN WATER 40 MG/ML
2 INJECTION, SOLUTION INTRAVENOUS ONCE
Status: COMPLETED | OUTPATIENT
Start: 2019-05-24 | End: 2019-05-24

## 2019-05-24 RX ORDER — POTASSIUM CHLORIDE AND SODIUM CHLORIDE 900; 300 MG/100ML; MG/100ML
INJECTION, SOLUTION INTRAVENOUS CONTINUOUS
Status: DISCONTINUED | OUTPATIENT
Start: 2019-05-24 | End: 2019-05-26

## 2019-05-24 RX ADMIN — HYDROMORPHONE HYDROCHLORIDE 0.5 MG: 1 INJECTION, SOLUTION INTRAMUSCULAR; INTRAVENOUS; SUBCUTANEOUS at 22:49

## 2019-05-24 RX ADMIN — METRONIDAZOLE 500 MG: 500 INJECTION, SOLUTION INTRAVENOUS at 03:20

## 2019-05-24 RX ADMIN — POTASSIUM CHLORIDE AND SODIUM CHLORIDE: 900; 300 INJECTION, SOLUTION INTRAVENOUS at 11:01

## 2019-05-24 RX ADMIN — MAGNESIUM SULFATE HEPTAHYDRATE 2 G: 40 INJECTION, SOLUTION INTRAVENOUS at 11:37

## 2019-05-24 RX ADMIN — SODIUM CHLORIDE: 9 INJECTION, SOLUTION INTRAVENOUS at 06:15

## 2019-05-24 RX ADMIN — CIPROFLOXACIN 400 MG: 2 INJECTION, SOLUTION INTRAVENOUS at 22:48

## 2019-05-24 RX ADMIN — CIPROFLOXACIN 400 MG: 2 INJECTION, SOLUTION INTRAVENOUS at 10:15

## 2019-05-24 RX ADMIN — METRONIDAZOLE 500 MG: 500 INJECTION, SOLUTION INTRAVENOUS at 18:36

## 2019-05-24 RX ADMIN — METRONIDAZOLE 500 MG: 500 INJECTION, SOLUTION INTRAVENOUS at 12:17

## 2019-05-24 RX ADMIN — HYDROMORPHONE HYDROCHLORIDE 0.5 MG: 1 INJECTION, SOLUTION INTRAMUSCULAR; INTRAVENOUS; SUBCUTANEOUS at 12:17

## 2019-05-24 RX ADMIN — Medication 10 ML: at 22:48

## 2019-05-24 ASSESSMENT — PAIN DESCRIPTION - FREQUENCY: FREQUENCY: CONTINUOUS

## 2019-05-24 ASSESSMENT — PAIN SCALES - GENERAL
PAINLEVEL_OUTOF10: 8
PAINLEVEL_OUTOF10: 0
PAINLEVEL_OUTOF10: 7
PAINLEVEL_OUTOF10: 8
PAINLEVEL_OUTOF10: 4

## 2019-05-24 ASSESSMENT — PAIN DESCRIPTION - LOCATION: LOCATION: THROAT

## 2019-05-24 ASSESSMENT — PAIN DESCRIPTION - PAIN TYPE: TYPE: ACUTE PAIN

## 2019-05-24 ASSESSMENT — PAIN DESCRIPTION - DESCRIPTORS: DESCRIPTORS: DISCOMFORT

## 2019-05-24 NOTE — PROGRESS NOTES
[facility]  Gastroenterology Progress Note  Reina Johnson is a 30 y.o. female patient. 1. Abnormal CT of the abdomen    2. Nausea and vomiting in adult    3. Abdominal pain, unspecified abdominal location        SUBJECTIVE:   NG tube placed yesterday. Pain improved. Passing flatus. No bm. No n/v.      Physical    VITALS:  BP 99/66   Pulse 81   Temp 98.5 °F (36.9 °C) (Oral)   Resp 16   Ht 5' 5\" (1.651 m)   Wt 130 lb (59 kg)   LMP 2019   SpO2 99%   BMI 21.63 kg/m²   TEMPERATURE:  Current - Temp: 98.5 °F (36.9 °C); Max - Temp  Av.6 °F (37 °C)  Min: 98.2 °F (36.8 °C)  Max: 99.2 °F (37.3 °C)    Abdomen soft, mild distention. Mild ttp, no HSM, Bowel sounds normal   rrr nl s1s2  No edema  nad  cta bilat nl effort. Data      Recent Labs     19  0710 19  0642   WBC 10.1 4.2 2.6*   HGB 13.6 11.7* 11.4*   HCT 41.1 35.6* 34.4*   MCV 90.4 91.4 90.1    209 207     Recent Labs     19  0710 19  0642    140 139   K 4.0 3.6 3.4*    104 107   CO2 24 20* 20*   PHOS  --  3.7 3.1   BUN 15 11 5*   CREATININE <0.5* 0.6 0.5*     Recent Labs     19  0710   AST 18 14*   ALT 11 8*   BILITOT 0.3 0.5   ALKPHOS 69 53     Recent Labs     19   LIPASE 40.0   AMYLASE 129*             ASSESSMENT  1. PSBO at the TI. Feeling better s/p NG tube placement. Suspect she may have ileitis (crohn's?). PLAN    - cipro and flagyl  - NG tube decompression  - AXR today. If improved, ?clamp NG and trial clear liquids. Discussed with Dr. Mark Howard, [address]      I have personally performed a face to face diagnostic evaluation on this patient. I have interviewed and examined the patient and I agree with the findings and recommended plan of care. In summary, my findings and plan are the following:  PSBO at the Ileum. Feels better + flatus. No bm. Need to f/u xray today.   ? If can tolerate clears. Less tender.        Dajuan Garvin MD  [address]

## 2019-05-24 NOTE — PROGRESS NOTES
Hospitalist Progress Note      PC[facility]: [facility]    Date of Admission: 5/21/2019    LOS: 1    Chief Complaint:   Chief Complaint   Patient presents with    Abdominal Pain     pt states she has had abd pain with n/v since 11am today. Case Summary:   57-year-old lady with no significant past medical history who presented with complaints of mid abdominal pain of acute onset with associated nausea and vomiting in the setting of her normal bowel motion on the day of admission. CT of the abdomen was concerning for probable terminal ileitis and small bowel obstruction. Active Hospital Problems    Diagnosis Date Noted    Hypokalemia [E87.6] 05/24/2019    Hypomagnesemia [E83.42] 05/24/2019    SBO (small bowel obstruction) ([address] [K56.609] 05/22/2019    Ileitis, terminal ([address] [K50.00] 05/22/2019    Abnormal CT of the abdomen [R93.5]     Abdominal pain [R10.9] 05/21/2019         Principal Problem:    SBO (small bowel obstruction) (HCC)  Active Problems:    Abdominal pain    Ileitis, terminal (HCC)    Abnormal CT of the abdomen    Hypokalemia    Hypomagnesemia  Resolved Problems:    * No resolved hospital problems. *    She had NG tube placed undersuction with significant symptom improvement overnight.  Passing flatus this morning with improved bowel sounds    -Continue conservative therapy  -Pain management with when necessary IV narcotics  -Continue antibiotic coverage with ciprofloxacin and Flagyl  -will defer NG tube management to surgery and determine when to start liquids  -continue on IV fluids for hydration until fully taking orally  -serial abdominal Xray  -replete electrolytes and divalents      Medications:  Reviewed  Infusion Medications    sodium chloride 100 mL/hr at 05/24/19 0615     Scheduled Medications    ciprofloxacin  400 mg Intravenous Q12H    metroNIDAZOLE  500 mg Intravenous Q8H    sodium chloride flush  10 mL Intravenous 2 times per day    enoxaparin  40 mg Subcutaneous Nightly    famotidine (PEPCID) injection  20 mg Intravenous BID     PRN Meds: phenol, HYDROmorphone, sodium chloride flush, magnesium hydroxide, ondansetron, acetaminophen      DVT Prophylaxis: Subcut in enoxaparin  Diet: Diet NPO Effective Now Exceptions are: Sips with Meds  Code Status: Full Code    Dispo: Anticipate discharge in the next 24-48hrs    ____________________________________________________________________________    Subjective:   Overnight Events:   Improved abdominal pains this morning      Physical Exam Performed:  BP 99/66   Pulse 81   Temp 98.5 °F (36.9 °C) (Oral)   Resp 16   Ht 5' 5\" (1.651 m)   Wt 130 lb (59 kg)   LMP 05/21/2019   SpO2 99%   BMI 21.63 kg/m²   General appearance: No apparent distress, appears stated age and cooperative. HEENT: Normocephalic, atraumatic, MMM, No sclera icterus/conjuctival palor  Neck: Supple, no thyromegally. No jugular venous distention. Respiratory:  Normal respiratory effort. Clear to auscultation, no Rales/Wheezes/Rhonchi. Cardiovascular: S1/S2 without murmurs, rubs or gallops. RRR  Abdomen: Soft, non-tender with improved bowel sounds, non-distended  Musculoskeletal: No clubbing, cyanosis or edema bilaterally. Skin: Skin color, texture, turgor normal.  No rashes or lesions.   Neurologic:  Cranial nerves: II-XII intact, ISABEL, No focal sensory/motor deficits  Psychiatric: Alert and oriented, thought content appropriate  Capillary Refill: Brisk,< 3 seconds   Peripheral Pulses: +2 palpable, equal bilaterally       Intake/Output Summary (Last 24 hours) at 5/24/2019 0752  Last data filed at 5/24/2019 0622  Gross per 24 hour   Intake 387 ml   Output 200 ml   Net 187 ml       Labs:   Recent Labs     05/21/19  1855 05/23/19  0710 05/24/19  0642   WBC 10.1 4.2 2.6*   HGB 13.6 11.7* 11.4*   HCT 41.1 35.6* 34.4*    209 207      Recent Labs     05/21/19  1855 05/23/19  0710 05/24/19  0642    140 139   K 4.0 3.6 3.4*    104 107   CO2 24 20* 20*   BUN 15 11 5*   CREATININE <0.5* 0.6 0.5*   CALCIUM 9.7 8.3 8.5   PHOS  --  3.7 3.1   AST 18 14*  --    ALT 11 8*  --    BILITOT 0.3 0.5  --    ALKPHOS 69 53  --      No results for input(s): CKTOTAL, TROPONINI in the last 72 hours. Urinalysis:    Lab Results   Component Value Date    NITRU Negative 05/21/2019    WBCUA 6 05/21/2019    BACTERIA 1+ 05/21/2019    RBCUA 4 05/21/2019    BLOODU Negative 05/21/2019    SPECGRAV >1.030 05/21/2019    GLUCOSEU Negative 05/21/2019       Radiology:  XR ABDOMEN (2 VIEWS)   Final Result   No significant change in findings consistent with partial small bowel   obstruction. XR ABDOMEN (KUB) (SINGLE AP VIEW)   Final Result   Dilation of small bowel loops throughout the abdomen, also present on prior   studies 10 yesterday, to the level of the distal most ileum where there is   irregular narrowing of the lumen corresponding with at least moderate   terminal ileitis seen on CT with wall thickening and increased mucosal   enhancement. Dilation of the small bowel may be related to partial   obstruction and/or ileus. FL SMALL BOWEL FOLLOW THROUGH ONLY   Final Result   Subtotal colectomy history is noted. Distal small bowel loops are prominently dilated. No passage into the rectal   region is identified. More delayed follow-up abdominal x-ray is planned at 10 p.m. tonight. CT ABDOMEN PELVIS W IV CONTRAST Additional Contrast? None   Final Result   Disproportionate small bowel distention throughout the entire abdomen and the   pelvis, greatest in the pelvis. Disproportionate nature raises the question   of a small bowel obstruction. There is questionable wall thickening at the   terminal ileal level. This raises the question of terminal ileitis. There is also multifocal colonic incomplete distention. Additional   thickening in the colon would be difficult to exclude.                  Dana Nava MD

## 2019-05-24 NOTE — PROGRESS NOTES
Assessment complete, see flowsheet. POC reviewed with the patient and mutually agreed upon. Potassium and Mag low, replacing through the IV. Patient upset about have NG in and wants to be advanced to a clear liquid diet. She stated that she was going to remove the NG. Explained the purpose of the NG and how it still needed to be part of her plan of care. Called surgery and explained to them the patients concerns. No needs at this time. Will monitor.

## 2019-05-24 NOTE — PROGRESS NOTES
GLUCOSE 124* 85 76     Hepatic:   Recent Labs     05/21/19  1855 05/23/19  0710   AST 18 14*   ALT 11 8*   BILITOT 0.3 0.5   ALKPHOS 69 53     Amylase:   Recent Labs     05/21/19  1855   AMYLASE 129*     Lipase:   Recent Labs     05/21/19  1855   LIPASE 40.0     Mag:      Recent Labs     05/23/19  0710 05/24/19  0642   MG 1.70* 1.60*      Phos:     Recent Labs     05/23/19  0710 05/24/19  0642   PHOS 3.7 3.1      Coags:   Recent Labs     05/23/19  0710   INR 1.12   APTT 35.1       Cultures:  Anaerobic culture  No results for input(s): LABANAE in the last 72 hours. Blood culture  No results for input(s): BC in the last 72 hours. Blood culture 2  No results for input(s): BLOODCULT2 in the last 72 hours. Body fluid culture  No results for input(s): BLOODCULT2 in the last 72 hours. Surgical culture  No results for input(s): CXSURG in the last 72 hours. Fecal occult  No results for input(s): OCCULTBLDFEC in the last 72 hours. Gram stain  No results for input(s): LABGRAM in the last 72 hours. Stool culture 1  No results for input(s): CXST in the last 72 hours. Stool culture 2  No results for input(s): STOOLCULT2 in the last 72 hours. Urine culture  No results for input(s): LABURIN in the last 72 hours. Wound abscess  No results for input(s): WNDABS in the last 72 hours. Pathology:  NA    Imaging:  I have personally reviewed the following films:    Xr Abdomen (kub) (single Ap View)    Result Date: 5/23/2019  EXAMINATION: ONE SUPINE XRAY VIEW(S) OF THE ABDOMEN 5/22/2019 9:52 pm COMPARISON: 05/22/2019 at 1519 hours, small-bowel follow-through today hours HISTORY: ORDERING SYSTEM PROVIDED HISTORY: r/o sbo TECHNOLOGIST PROVIDED HISTORY: Reason for exam:->r/o sbo Ordering Physician Provided Reason for Exam: F/u SBFT.  Acuity: Acute Type of Exam: Subsequent/Follow-up FINDINGS: There is dilation of small bowel throughout the abdomen and pelvis; the distal most small bowel is more prominently lesion in the left kidney is noted medially on axial image 63. This is a small cyst.  No hydronephrosis is noted. GI/Bowel: Segmental incomplete colonic distention is noted. There is questionable colonic wall thickening. Multiple dilated small bowel loops are noted throughout the abdomen. The distal stomach and duodenum are relatively collapsed. Wall thickening in this distribution would be difficult to exclude, particularly in the duodenum. Pelvis: There are significantly dilated small bowel loops throughout the pelvis. These loops are filled with fluid. There is questionable wall thickening at the terminal ileum. Stool is noted in the rectum. Sigmoid colon is otherwise incompletely distended and difficult to characterize completely. Low-density in the vagina is likely due to a tampon. Peritoneum/Retroperitoneum: Aorta is normal in caliber. Scattered small lymph nodes are noted. Mesenteric vessels are patent. Bones/Soft Tissues: No destructive bone lesions are noted. Disproportionate small bowel distention throughout the entire abdomen and the pelvis, greatest in the pelvis. Disproportionate nature raises the question of a small bowel obstruction. There is questionable wall thickening at the terminal ileal level. This raises the question of terminal ileitis. There is also multifocal colonic incomplete distention. Additional thickening in the colon would be difficult to exclude. Fl Small Bowel Follow Through Only    Result Date: 5/22/2019  EXAMINATION: SMALL BOWEL FOLLOW THROUGH SERIES 5/22/2019 TECHNIQUE: Small bowel follow through series was performed with overhead images and spot images. FLUOROSCOPY DOSE AND TYPE OR TIME AND EXPOSURES: 0 minutes. 0 exposures.  COMPARISON: CT abdomen and pelvis, 05/21/2019 HISTORY: ORDERING SYSTEM PROVIDED HISTORY: eval for obstruction TECHNOLOGIST PROVIDED HISTORY: Reason for exam:->eval for obstruction Ordering Physician Provided Reason for Exam: evaluate following  Electrolyte derangement      Plan:  1. Exam/symptoms overall improved since NGT placed yesterday; follow up AXR this morning; no plans for surgical intervention at this time  2. Continue NPO diet and NGT decompression; if AXR normal, will plan to remove NGT and advance to clear liquid diet  3. IV hydration; monitor and corrected electrolytes  4. Antibiotics  5. Activity as tolerated, ambulate TID  6. Pulmonary toilet, incentive spirometry  7. PRN analgesics and antiemetics--minimizing narcotics as tolerated  8. DVT prophylaxis with Lovenox  9. Management of medical comorbid etiologies per primary team and consulting services    EDUCATION:  Educated patient on plan of care and disease process--all questions answered. Plans discussed with patient and nursing. Reviewed and discussed with Dr. Troy Valladares. Signed:  FABIO Banegas - CNP  5/24/2019 8:50 AM     I have personally performed a face to face diagnostic evaluation on this patient. I have interviewed and examined the patient and I agree with the assessment above. In summary, my findings and plan are the following:   Ms. Candis Kimball is a 30 y.o. female who presents with   Small bowel obstruction     Plan:  1. The patient has a small bowel obstruction that is from adhesive disease vs infectious terminal ileitis. Small bowel follow through consistent with partial small bowel obstruction. NG placed, abdominal XR confirms partial bowel obstruction. Clinically she feels better though and declined exploration this morning. If continues to improve tomorrow may remove NG and advance to clear liquids as etiology is likely infectious or inflammatory. If does not improve, etiology is likely adhesive and would need exploration  2. Bowel rest, NG decompression for today  3. IVF   4. Monitor leukocytosis, antibiotics  5. Pain medication and antiemetics as needed with caution as they may mask a worsening exam  6.  Monitor and replace electrolytes as needed, electrolyte derangement can alter bowel function    Douglas B. Arnita Gosselin MD, FACS  5/24/2019  9:05 PM

## 2019-05-24 NOTE — PROGRESS NOTES
Dr Tex oRse asking about pt's status/did she want surgery this AM. Pt stated she is 'feeling better', and she 'did not have pain during the night', so she wants to wait on having the surgery. Dr Tex Rose said he would see her later this morning, pt aware.

## 2019-05-24 NOTE — PLAN OF CARE
Problem: Pain:  Description  Pain management should include both nonpharmacologic and pharmacologic interventions. Goal: Pain level will decrease  Description  Pain level will decrease  5/24/2019 0754 by David Alonzo RN  Outcome: Ongoing  Note:   Pain assessed using 0-10 scale. Patient able to voice pain level and states pain improved with PRN medication administration. 5/24/2019 0410 by Tristen Fernandez RN  Outcome: Ongoing  Goal: Control of acute pain  Description  Control of acute pain  5/24/2019 0754 by David Alonzo RN  Outcome: Ongoing  5/24/2019 0410 by Tristen Fernandez RN  Outcome: Ongoing  Goal: Control of chronic pain  Description  Control of chronic pain  5/24/2019 0754 by David Alonzo RN  Outcome: Ongoing  5/24/2019 0410 by Tristen Fernandez RN  Outcome: Ongoing     Problem:  Bowel/Gastric:  Goal: Bowel function will improve  Description  Bowel function will improve  5/24/2019 0754 by David Alonzo RN  Outcome: Ongoing  5/24/2019 0410 by Tristen Fernandez RN  Outcome: Ongoing  Goal: Diagnostic test results will improve  Description  Diagnostic test results will improve  5/24/2019 0754 by David Alonzo RN  Outcome: Ongoing  5/24/2019 0410 by Tristen Fernandez RN  Outcome: Ongoing  Goal: Occurrences of nausea will decrease  Description  Occurrences of nausea will decrease  5/24/2019 0754 by David Alonzo RN  Outcome: Ongoing  5/24/2019 0410 by Tristen Fernandez RN  Outcome: Ongoing  Goal: Occurrences of vomiting will decrease  Description  Occurrences of vomiting will decrease  5/24/2019 0754 by David Alonzo RN  Outcome: Ongoing  5/24/2019 0410 by Tristen Fernandez RN  Outcome: Ongoing

## 2019-05-25 ENCOUNTER — APPOINTMENT (OUTPATIENT)
Dept: GENERAL RADIOLOGY | Age: 24
DRG: 389 | End: 2019-05-25
Payer: COMMERCIAL

## 2019-05-25 PROBLEM — E87.6 HYPOKALEMIA: Status: RESOLVED | Noted: 2019-05-24 | Resolved: 2019-05-25

## 2019-05-25 PROBLEM — R10.9 ABDOMINAL PAIN: Status: RESOLVED | Noted: 2019-05-21 | Resolved: 2019-05-25

## 2019-05-25 LAB
ANION GAP SERPL CALCULATED.3IONS-SCNC: 12 MMOL/L (ref 3–16)
BASOPHILS ABSOLUTE: 0 K/UL (ref 0–0.2)
BASOPHILS RELATIVE PERCENT: 0.7 %
BUN BLDV-MCNC: 4 MG/DL (ref 7–20)
CALCIUM SERPL-MCNC: 8.6 MG/DL (ref 8.3–10.6)
CHLORIDE BLD-SCNC: 109 MMOL/L (ref 99–110)
CO2: 19 MMOL/L (ref 21–32)
CREAT SERPL-MCNC: 0.5 MG/DL (ref 0.6–1.1)
EOSINOPHILS ABSOLUTE: 0.2 K/UL (ref 0–0.6)
EOSINOPHILS RELATIVE PERCENT: 6.7 %
GFR AFRICAN AMERICAN: >60
GFR NON-AFRICAN AMERICAN: >60
GLUCOSE BLD-MCNC: 76 MG/DL (ref 70–99)
HCT VFR BLD CALC: 33.2 % (ref 36–48)
HEMOGLOBIN: 10.9 G/DL (ref 12–16)
LYMPHOCYTES ABSOLUTE: 0.8 K/UL (ref 1–5.1)
LYMPHOCYTES RELATIVE PERCENT: 28.2 %
MAGNESIUM: 1.7 MG/DL (ref 1.8–2.4)
MCH RBC QN AUTO: 30.1 PG (ref 26–34)
MCHC RBC AUTO-ENTMCNC: 32.9 G/DL (ref 31–36)
MCV RBC AUTO: 91.5 FL (ref 80–100)
MONOCYTES ABSOLUTE: 0.4 K/UL (ref 0–1.3)
MONOCYTES RELATIVE PERCENT: 13.6 %
NEUTROPHILS ABSOLUTE: 1.5 K/UL (ref 1.7–7.7)
NEUTROPHILS RELATIVE PERCENT: 50.8 %
PDW BLD-RTO: 15.9 % (ref 12.4–15.4)
PHOSPHORUS: 2.8 MG/DL (ref 2.5–4.9)
PLATELET # BLD: 207 K/UL (ref 135–450)
PMV BLD AUTO: 9.9 FL (ref 5–10.5)
POTASSIUM SERPL-SCNC: 4.1 MMOL/L (ref 3.5–5.1)
RBC # BLD: 3.63 M/UL (ref 4–5.2)
SODIUM BLD-SCNC: 140 MMOL/L (ref 136–145)
WBC # BLD: 2.9 K/UL (ref 4–11)

## 2019-05-25 PROCEDURE — 84100 ASSAY OF PHOSPHORUS: CPT

## 2019-05-25 PROCEDURE — 6360000002 HC RX W HCPCS: Performed by: INTERNAL MEDICINE

## 2019-05-25 PROCEDURE — 74019 RADEX ABDOMEN 2 VIEWS: CPT

## 2019-05-25 PROCEDURE — 36415 COLL VENOUS BLD VENIPUNCTURE: CPT

## 2019-05-25 PROCEDURE — 2500000003 HC RX 250 WO HCPCS: Performed by: INTERNAL MEDICINE

## 2019-05-25 PROCEDURE — 83735 ASSAY OF MAGNESIUM: CPT

## 2019-05-25 PROCEDURE — 99232 SBSQ HOSP IP/OBS MODERATE 35: CPT | Performed by: SURGERY

## 2019-05-25 PROCEDURE — 2580000003 HC RX 258: Performed by: INTERNAL MEDICINE

## 2019-05-25 PROCEDURE — 1200000000 HC SEMI PRIVATE

## 2019-05-25 PROCEDURE — 80048 BASIC METABOLIC PNL TOTAL CA: CPT

## 2019-05-25 PROCEDURE — 85025 COMPLETE CBC W/AUTO DIFF WBC: CPT

## 2019-05-25 RX ADMIN — Medication 10 ML: at 09:14

## 2019-05-25 RX ADMIN — CIPROFLOXACIN 400 MG: 2 INJECTION, SOLUTION INTRAVENOUS at 09:14

## 2019-05-25 RX ADMIN — METRONIDAZOLE 500 MG: 500 INJECTION, SOLUTION INTRAVENOUS at 18:54

## 2019-05-25 RX ADMIN — Medication 10 ML: at 20:10

## 2019-05-25 RX ADMIN — HYDROMORPHONE HYDROCHLORIDE 0.5 MG: 1 INJECTION, SOLUTION INTRAMUSCULAR; INTRAVENOUS; SUBCUTANEOUS at 03:27

## 2019-05-25 RX ADMIN — POTASSIUM CHLORIDE AND SODIUM CHLORIDE: 900; 300 INJECTION, SOLUTION INTRAVENOUS at 03:53

## 2019-05-25 RX ADMIN — METRONIDAZOLE 500 MG: 500 INJECTION, SOLUTION INTRAVENOUS at 11:41

## 2019-05-25 RX ADMIN — METRONIDAZOLE 500 MG: 500 INJECTION, SOLUTION INTRAVENOUS at 03:27

## 2019-05-25 RX ADMIN — CIPROFLOXACIN 400 MG: 2 INJECTION, SOLUTION INTRAVENOUS at 20:08

## 2019-05-25 ASSESSMENT — PAIN SCALES - GENERAL: PAINLEVEL_OUTOF10: 10

## 2019-05-25 NOTE — PLAN OF CARE
Problem: Pain:  Goal: Pain level will decrease  Description  Pain level will decrease  5/25/2019 0948 by Sandra Sharpe RN  Outcome: Ongoing  Pt using dilaudid for pain control  Problem:  Bowel/Gastric:  Goal: Bowel function will improve  Description  Bowel function will improve  5/25/2019 0948 by Sandra Sharpe RN  Outcome: Ongoing  Pt passing flatus

## 2019-05-25 NOTE — PLAN OF CARE
Problem: Pain:  Goal: Pain level will decrease  Description  Pain level will decrease  Outcome: Ongoing   Patient able to report pain, 0-10 scale used, pharmacologic and non pharmacologic  Interventions in use and discussed with patient. Problem: Bowel/Gastric:  Goal: Bowel function will improve  Description  Bowel function will improve  Outcome: Ongoing   Patient reports passing flattus today, but no BM.

## 2019-05-25 NOTE — PROGRESS NOTES
Morning assessment complete; pt rates pain 0/10    POC discussed - pt encouraged to ambulate as often as tolerated; The care plan and education has been reviewed and mutually agreed upon with the patient. Pt asking about getting NG tube out. Pt states she is passing flatus.      Call light and all needs in reach; pt denies needs at this time

## 2019-05-25 NOTE — PROGRESS NOTES
Hospitalist Progress Note      PCP: [facility]    Date of Admission: 5/21/2019    LOS: 2    Chief Complaint:   Chief Complaint   Patient presents with    Abdominal Pain     pt states she has had abd pain with n/v since 11am today. Case Summary:   68-year-old lady with no significant past medical history who presented with complaints of mid abdominal pain of acute onset with associated nausea and vomiting in the setting of her normal bowel motion on the day of admission. CT of the abdomen was concerning for probable terminal ileitis and small bowel obstruction. Active Hospital Problems    Diagnosis Date Noted    Hypomagnesemia [E83.42] 05/24/2019    SBO (small bowel obstruction) ([address] [K56.609] 05/22/2019    Ileitis, terminal ([address] [K50.00] 05/22/2019         Principal Problem:    SBO (small bowel obstruction) ([address]): Still with NG in place under suction. Reports positive flatus with improved bowel sounds this morning. Denies any abdominal pains overnight and this morning. Wean to start oral intake.  -Continue conservative therapy  -We will await general surgery to suction oral intake  -We'll probably remove NG tube today and try on liquids  -Monitor for worsening symptoms, if recurs she may need exploration  -Discussed with general surgery this morning    Active Problems:    Ileitis, terminal ([address]): -Continue antibiotic coverage with ciprofloxacin and Flagyl.  will discuss with GI to consider when to stop    Hypomagnesemia: replete and monitor      Resolved Problems:    Abdominal pain    Hypokalemia      Medications:  Reviewed  Infusion Medications    0.9% NaCl with KCl 40 mEq 100 mL/hr at 05/25/19 0353     Scheduled Medications    ciprofloxacin  400 mg Intravenous Q12H    metroNIDAZOLE  500 mg Intravenous Q8H    sodium chloride flush  10 mL Intravenous 2 times per day    enoxaparin  40 mg Subcutaneous Nightly    famotidine (PEPCID) injection  20 mg Intravenous BID     PRN Meds: phenol, HYDROmorphone, sodium chloride flush, magnesium hydroxide, ondansetron, acetaminophen      DVT Prophylaxis: Subcut in enoxaparin  Diet: DIET CLEAR LIQUID;  Code Status: Full Code    Dispo: Anticipate discharge in the next 24hrs if continues to improve and remained stable    ____________________________________________________________________________    Subjective:   Overnight Events:   No abdominal pains this morning. Reji Pikes to start oral intake      Physical Exam Performed:  /74   Pulse 90   Temp 98.7 °F (37.1 °C) (Oral)   Resp 16   Ht 5' 5\" (1.651 m)   Wt 130 lb (59 kg)   LMP 05/21/2019   SpO2 97%   BMI 21.63 kg/m²   General appearance: No apparent distress, appears stated age and cooperative. HEENT: Normocephalic, atraumatic, MMM, No sclera icterus/conjuctival palor  Neck: Supple, no thyromegally. No jugular venous distention. Respiratory:  Normal respiratory effort. Clear to auscultation, no Rales/Wheezes/Rhonchi. Cardiovascular: S1/S2 without murmurs, rubs or gallops. RRR  Abdomen: Soft, non-tender with improved bowel sounds, non-distended  Musculoskeletal: No clubbing, cyanosis or edema bilaterally. Skin: Skin color, texture, turgor normal.  No rashes or lesions.   Neurologic:  Cranial nerves: II-XII intact, ISABEL, No focal sensory/motor deficits        Intake/Output Summary (Last 24 hours) at 5/25/2019 1359  Last data filed at 5/25/2019 0914  Gross per 24 hour   Intake 2417 ml   Output 500 ml   Net 1917 ml       Labs:   Recent Labs     05/23/19  0710 05/24/19  0642 05/25/19  0714   WBC 4.2 2.6* 2.9*   HGB 11.7* 11.4* 10.9*   HCT 35.6* 34.4* 33.2*    207 207      Recent Labs     05/23/19  0710 05/24/19  0642 05/25/19  0714    139 140   K 3.6 3.4* 4.1    107 109   CO2 20* 20* 19*   BUN 11 5* 4*   CREATININE 0.6 0.5* 0.5*   CALCIUM 8.3 8.5 8.6   PHOS 3.7 3.1 2.8   AST 14*  --   --    ALT 8*  --   --    BILITOT 0.5  --   --    ALKPHOS 53  --   --      No results for input(s): Juan Luis Smith in the last 72 hours. Urinalysis:    Lab Results   Component Value Date    NITRU Negative 05/21/2019    WBCUA 6 05/21/2019    BACTERIA 1+ 05/21/2019    RBCUA 4 05/21/2019    BLOODU Negative 05/21/2019    SPECGRAV >1.030 05/21/2019    GLUCOSEU Negative 05/21/2019       Radiology:  XR ABDOMEN (2 VIEWS)   Final Result   Mildly decreased but persistent small bowel obstruction after nasogastric   tube placement, over the past 24 hours. Persistent partial high-grade small   bowel obstruction remains however. Much of the contrast has traversed past   the lesion in the terminal ileum demonstrated on the CT examination. Upon   review of the CT examination there is indeed a lesion of the terminal ileum   with associated stricture. This may be due to inflammatory bowel disease,   infectious process or neoplasm. XR ABDOMEN (2 VIEWS)   Final Result   No significant change in findings consistent with partial small bowel   obstruction. XR ABDOMEN (KUB) (SINGLE AP VIEW)   Final Result   Dilation of small bowel loops throughout the abdomen, also present on prior   studies 10 yesterday, to the level of the distal most ileum where there is   irregular narrowing of the lumen corresponding with at least moderate   terminal ileitis seen on CT with wall thickening and increased mucosal   enhancement. Dilation of the small bowel may be related to partial   obstruction and/or ileus. FL SMALL BOWEL FOLLOW THROUGH ONLY   Final Result   Subtotal colectomy history is noted. Distal small bowel loops are prominently dilated. No passage into the rectal   region is identified. More delayed follow-up abdominal x-ray is planned at 10 p.m. tonight. CT ABDOMEN PELVIS W IV CONTRAST Additional Contrast? None   Final Result   Disproportionate small bowel distention throughout the entire abdomen and the   pelvis, greatest in the pelvis.   Disproportionate nature raises the question   of a small bowel obstruction. There is questionable wall thickening at the   terminal ileal level. This raises the question of terminal ileitis. There is also multifocal colonic incomplete distention. Additional   thickening in the colon would be difficult to exclude.          XR ABDOMEN (2 VIEWS)    (Results Pending)           Mariely Lyon MD

## 2019-05-25 NOTE — PROGRESS NOTES
[facility] GI  Gastroenterology Progress Note    Ignacia Luna is a 30 y.o. female patient. 1. Abnormal CT of the abdomen    2. Nausea and vomiting in adult    3. Abdominal pain, unspecified abdominal location        SUBJECTIVE:    Passing gas   Feels better with NGT decompression     ROS:  Cardiovascular ROS: no chest pain or dyspnea on exertion  Gastrointestinal ROS: see above  Respiratory ROS: no cough, shortness of breath, or wheezing    Physical    VITALS:  BP (!) 78/49   Pulse 80   Temp 98.5 °F (36.9 °C) (Oral)   Resp 16   Ht 5' 5\" (1.651 m)   Wt 130 lb (59 kg)   LMP 2019   SpO2 97%   BMI 21.63 kg/m²   TEMPERATURE:  Current - Temp: 98.5 °F (36.9 °C); Max - Temp  Av.5 °F (36.9 °C)  Min: 98.4 °F (36.9 °C)  Max: 98.5 °F (36.9 °C)    NAD  RRR, Nl s1s2  Lungs CTA Bilaterally, normal effort  Abdomen soft, slightly distended, hypoactive bowel sounds   AAOx3, No asterixis     Data      CBC:   Recent Labs     19  0710 19  0642 19  0714   WBC 4.2 2.6* 2.9*   RBC 3.90* 3.81* 3.63*   HGB 11.7* 11.4* 10.9*   HCT 35.6* 34.4* 33.2*    207 207   MCV 91.4 90.1 91.5   MCH 30.2 29.9 30.1   MCHC 33.0 33.2 32.9   RDW 16.3* 16.0* 15.9*        BMP:  Recent Labs     19  0710 19  0642 19  0714    139 140   K 3.6 3.4* 4.1    107 109   CO2 20* 20* 19*   BUN 11 5* 4*   CREATININE 0.6 0.5* 0.5*   CALCIUM 8.3 8.5 8.6   GLUCOSE 85 76 76        Hepatic Function Panel:   Lab Results   Component Value Date    ALKPHOS 53 2019    ALT 8 2019    AST 14 2019    PROT 6.3 2019    BILITOT 0.5 2019    LABALBU 3.6 2019       No results for input(s): LIPASE, AMYLASE in the last 72 hours. Recent Labs     19  0710   PROTIME 12.8   INR 1.12     No results for input(s): PTT in the last 72 hours. No results for input(s): OCCULTBLD in the last 72 hours. ASSESSMENT  PSBO at the TI. Feeling better s/p NG tube placement.    Suspect she may have ileitis (crohn's?).          PLAN    - cipro and flagyl  - NG tube decompression  - patient insistent on going home today. She has a 3year old child at home and she already consumed her PTO. I told her she is not medically ready for discharge.     - await surgical input - conservative management vs. exploration (per Dr. Meraz Backer note 5/24)  - she eventually will need a colonoscopy to evaluate her TI once pSBO improves/treated     Jack Collier MD, MSc  [facility] [address]

## 2019-05-25 NOTE — PROGRESS NOTES
Luci Yanes is a 30 y.o. female patient. CC-SBO    HPI-33 year old female seen in follow up for a partial SBO   Current Facility-Administered Medications   Medication Dose Route Frequency Provider Last Rate Last Dose    0.9% NaCl with KCl 40 mEq infusion   Intravenous Continuous Roberto Pond  mL/hr at 05/25/19 0353      ciprofloxacin (CIPRO) IVPB 400 mg  400 mg Intravenous Q12H Praful Spence MD   Stopped at 05/25/19 1031    metronidazole (FLAGYL) 500 mg in NaCl 100 mL IVPB premix  500 mg Intravenous Q8H Praful Spence  mL/hr at 05/25/19 1141 500 mg at 05/25/19 1141    phenol 1.4 % mouth spray 1 spray  1 spray Mouth/Throat Q2H PRN FABIO Olvera - CNP        HYDROmorphone HCl PF (DILAUDID) injection 0.5 mg  0.5 mg Intravenous Q4H PRN Candie Chirinos MD   0.5 mg at 05/25/19 0327    sodium chloride flush 0.9 % injection 10 mL  10 mL Intravenous 2 times per day Trina Piedra MD   10 mL at 05/25/19 0914    sodium chloride flush 0.9 % injection 10 mL  10 mL Intravenous PRN Trina Piedra MD   10 mL at 05/22/19 0911    magnesium hydroxide (MILK OF MAGNESIA) 400 MG/5ML suspension 30 mL  30 mL Oral Daily PRN Trina Piedra MD        ondansetron (ZOFRAN) injection 4 mg  4 mg Intravenous Q6H PRN Trina Piedra MD   4 mg at 05/23/19 0702    enoxaparin (LOVENOX) injection 40 mg  40 mg Subcutaneous Nightly Trina Piedra MD        acetaminophen (TYLENOL) tablet 650 mg  650 mg Oral Q4H PRN Trina Piedra MD        famotidine (PEPCID) injection 20 mg  20 mg Intravenous BID Trina Piedra MD   20 mg at 05/23/19 0908     No Known Allergies  Principal Problem:    SBO (small bowel obstruction) (HCC)  Active Problems:    Abdominal pain    Ileitis, terminal (HCC)    Abnormal CT of the abdomen    Hypokalemia    Hypomagnesemia  Resolved Problems:    * No resolved hospital problems. *    Blood pressure 117/74, pulse 90, temperature 98.7 °F (37.1 °C), temperature source Oral, resp.  rate 16, height

## 2019-05-26 VITALS
HEIGHT: 65 IN | OXYGEN SATURATION: 99 % | DIASTOLIC BLOOD PRESSURE: 61 MMHG | BODY MASS INDEX: 21.66 KG/M2 | RESPIRATION RATE: 16 BRPM | HEART RATE: 76 BPM | SYSTOLIC BLOOD PRESSURE: 97 MMHG | WEIGHT: 130 LBS | TEMPERATURE: 97.5 F

## 2019-05-26 LAB
ANION GAP SERPL CALCULATED.3IONS-SCNC: 15 MMOL/L (ref 3–16)
BASOPHILS ABSOLUTE: 0 K/UL (ref 0–0.2)
BASOPHILS RELATIVE PERCENT: 0.8 %
BUN BLDV-MCNC: 3 MG/DL (ref 7–20)
CALCIUM SERPL-MCNC: 8.8 MG/DL (ref 8.3–10.6)
CHLORIDE BLD-SCNC: 107 MMOL/L (ref 99–110)
CO2: 20 MMOL/L (ref 21–32)
CREAT SERPL-MCNC: 0.5 MG/DL (ref 0.6–1.1)
EOSINOPHILS ABSOLUTE: 0.3 K/UL (ref 0–0.6)
EOSINOPHILS RELATIVE PERCENT: 6.4 %
GFR AFRICAN AMERICAN: >60
GFR NON-AFRICAN AMERICAN: >60
GLUCOSE BLD-MCNC: 88 MG/DL (ref 70–99)
HCT VFR BLD CALC: 34 % (ref 36–48)
HEMOGLOBIN: 11.5 G/DL (ref 12–16)
LYMPHOCYTES ABSOLUTE: 0.9 K/UL (ref 1–5.1)
LYMPHOCYTES RELATIVE PERCENT: 24.1 %
MCH RBC QN AUTO: 30.4 PG (ref 26–34)
MCHC RBC AUTO-ENTMCNC: 33.7 G/DL (ref 31–36)
MCV RBC AUTO: 90 FL (ref 80–100)
MONOCYTES ABSOLUTE: 0.5 K/UL (ref 0–1.3)
MONOCYTES RELATIVE PERCENT: 12.5 %
NEUTROPHILS ABSOLUTE: 2.2 K/UL (ref 1.7–7.7)
NEUTROPHILS RELATIVE PERCENT: 56.2 %
PDW BLD-RTO: 15.6 % (ref 12.4–15.4)
PLATELET # BLD: 233 K/UL (ref 135–450)
PMV BLD AUTO: 9.4 FL (ref 5–10.5)
POTASSIUM SERPL-SCNC: 4.2 MMOL/L (ref 3.5–5.1)
RBC # BLD: 3.78 M/UL (ref 4–5.2)
SODIUM BLD-SCNC: 142 MMOL/L (ref 136–145)
WBC # BLD: 3.9 K/UL (ref 4–11)

## 2019-05-26 PROCEDURE — 36415 COLL VENOUS BLD VENIPUNCTURE: CPT

## 2019-05-26 PROCEDURE — 6370000000 HC RX 637 (ALT 250 FOR IP): Performed by: INTERNAL MEDICINE

## 2019-05-26 PROCEDURE — 99232 SBSQ HOSP IP/OBS MODERATE 35: CPT | Performed by: SURGERY

## 2019-05-26 PROCEDURE — 80048 BASIC METABOLIC PNL TOTAL CA: CPT

## 2019-05-26 PROCEDURE — 6360000002 HC RX W HCPCS: Performed by: INTERNAL MEDICINE

## 2019-05-26 PROCEDURE — 85025 COMPLETE CBC W/AUTO DIFF WBC: CPT

## 2019-05-26 PROCEDURE — 2500000003 HC RX 250 WO HCPCS: Performed by: INTERNAL MEDICINE

## 2019-05-26 RX ORDER — METRONIDAZOLE 500 MG/1
500 TABLET ORAL EVERY 8 HOURS SCHEDULED
Qty: 30 TABLET | Refills: 0 | Status: SHIPPED | OUTPATIENT
Start: 2019-05-26 | End: 2019-06-05

## 2019-05-26 RX ORDER — CIPROFLOXACIN 500 MG/1
500 TABLET, FILM COATED ORAL EVERY 12 HOURS SCHEDULED
Qty: 20 TABLET | Refills: 0 | Status: SHIPPED | OUTPATIENT
Start: 2019-05-26 | End: 2019-06-05

## 2019-05-26 RX ORDER — CIPROFLOXACIN 500 MG/1
500 TABLET, FILM COATED ORAL EVERY 12 HOURS SCHEDULED
Status: DISCONTINUED | OUTPATIENT
Start: 2019-05-26 | End: 2019-05-26 | Stop reason: HOSPADM

## 2019-05-26 RX ORDER — METRONIDAZOLE 250 MG/1
500 TABLET ORAL EVERY 8 HOURS SCHEDULED
Status: DISCONTINUED | OUTPATIENT
Start: 2019-05-26 | End: 2019-05-26 | Stop reason: HOSPADM

## 2019-05-26 RX ADMIN — METRONIDAZOLE 500 MG: 250 TABLET, FILM COATED ORAL at 14:13

## 2019-05-26 RX ADMIN — CIPROFLOXACIN 400 MG: 2 INJECTION, SOLUTION INTRAVENOUS at 08:06

## 2019-05-26 RX ADMIN — METRONIDAZOLE 500 MG: 500 INJECTION, SOLUTION INTRAVENOUS at 02:54

## 2019-05-26 RX ADMIN — POTASSIUM CHLORIDE AND SODIUM CHLORIDE: 900; 300 INJECTION, SOLUTION INTRAVENOUS at 02:55

## 2019-05-26 NOTE — PROGRESS NOTES
Shift assessment completed, see flowsheets. Medications administered, see MAR. Plan of care discussed with patient. Pt denies further needs at this time. Will continue to monitor.   Devika Zarate RN

## 2019-05-26 NOTE — PROGRESS NOTES
[facility] GI  Gastroenterology Progress Note    Lidia Giang is a 30 y.o. female patient. 1. Abnormal CT of the abdomen    2. Nausea and vomiting in adult    3. Abdominal pain, unspecified abdominal location        SUBJECTIVE:    NGT removed and tolerating clears  Passing gas and no BM yet     ROS:  Cardiovascular ROS: no chest pain or dyspnea on exertion  Gastrointestinal ROS: see above  Respiratory ROS: no cough, shortness of breath, or wheezing    Physical    VITALS:  BP 99/63   Pulse 95   Temp 98.4 °F (36.9 °C) (Oral)   Resp 16   Ht 5' 5\" (1.651 m)   Wt 130 lb (59 kg)   LMP 2019   SpO2 99%   BMI 21.63 kg/m²   TEMPERATURE:  Current - Temp: 98.4 °F (36.9 °C); Max - Temp  Av.4 °F (36.9 °C)  Min: 98.2 °F (36.8 °C)  Max: 98.7 °F (37.1 °C)    NAD  RRR, Nl s1s2  Lungs CTA Bilaterally, normal effort  Abdomen soft, slightly distended, hypoactive bowel sounds   AAOx3, No asterixis     Data      CBC:   Recent Labs     19  0710 19  0642 19  0714   WBC 4.2 2.6* 2.9*   RBC 3.90* 3.81* 3.63*   HGB 11.7* 11.4* 10.9*   HCT 35.6* 34.4* 33.2*    207 207   MCV 91.4 90.1 91.5   MCH 30.2 29.9 30.1   MCHC 33.0 33.2 32.9   RDW 16.3* 16.0* 15.9*        BMP:  Recent Labs     19  0710 19  0642 19  0714    139 140   K 3.6 3.4* 4.1    107 109   CO2 20* 20* 19*   BUN 11 5* 4*   CREATININE 0.6 0.5* 0.5*   CALCIUM 8.3 8.5 8.6   GLUCOSE 85 76 76        Hepatic Function Panel:   Lab Results   Component Value Date    ALKPHOS 53 2019    ALT 8 2019    AST 14 2019    PROT 6.3 2019    BILITOT 0.5 2019    LABALBU 3.6 2019       No results for input(s): LIPASE, AMYLASE in the last 72 hours. Recent Labs     19  0710   PROTIME 12.8   INR 1.12     No results for input(s): PTT in the last 72 hours. No results for input(s): OCCULTBLD in the last 72 hours.         ASSESSMENT  PSBO at the TI.  Feeling better s/p NG tube placement, NG now

## 2019-05-26 NOTE — PROGRESS NOTES
Hospitalist Progress Note      PCP: [facility]    Date of Admission: 5/21/2019    LOS: 3    Chief Complaint:   Chief Complaint   Patient presents with    Abdominal Pain     pt states she has had abd pain with n/v since 11am today. Case Summary:   78-year-old lady with no significant past medical history who presented with complaints of mid abdominal pain of acute onset with associated nausea and vomiting in the setting of her normal bowel motion on the day of admission. CT of the abdomen was concerning for probable terminal ileitis and small bowel obstruction. ddx- crohn's vs SBO        Active Hospital Problems    Diagnosis Date Noted    Hypomagnesemia [E83.42] 05/24/2019    SBO (small bowel obstruction) ([address] [K56.609] 05/22/2019    Ileitis, terminal ([address] [K50.00] 05/22/2019         Principal Problem:    SBO (small bowel obstruction) ([address]): NG removed yesterday and started clears with tolerance. flatus with good bowel sounds. no abdominal pains. Wean to start oral intake.  -Continue conservative therapy  -advance diet as tolerated.  Will do full this AM and soft this at lunch  -anticipate DC later today    Active Problems:    Ileitis, terminal ([address]): -Continue antibiotic coverage with ciprofloxacin and Flagyl and will switch to oral today and cotinue to complete total of 10days    Hypomagnesemia: replete and monitor      Resolved Problems:    Abdominal pain    Hypokalemia      Medications:  Reviewed  Infusion Medications    0.9% NaCl with KCl 40 mEq 100 mL/hr at 05/26/19 0255     Scheduled Medications    ciprofloxacin  400 mg Intravenous Q12H    metroNIDAZOLE  500 mg Intravenous Q8H    sodium chloride flush  10 mL Intravenous 2 times per day    enoxaparin  40 mg Subcutaneous Nightly    famotidine (PEPCID) injection  20 mg Intravenous BID     PRN Meds: phenol, HYDROmorphone, sodium chloride flush, magnesium hydroxide, ondansetron, acetaminophen      DVT Prophylaxis: Subcut in enoxaparin  Diet: DIET FULL LIQUID;  Code Status: Full Code    Dispo: Anticipate discharge later today if tolerating diet    ____________________________________________________________________________    Subjective:   Overnight Events:   No abdominal pains this morning. Advance diet      Physical Exam Performed:  BP 97/61   Pulse 76   Temp 97.5 °F (36.4 °C) (Temporal)   Resp 16   Ht 5' 5\" (1.651 m)   Wt 130 lb (59 kg)   LMP 05/21/2019   SpO2 99%   BMI 21.63 kg/m²   General appearance: No apparent distress, appears stated age and cooperative. HEENT: Normocephalic, atraumatic, MMM, No sclera icterus/conjuctival palor  Neck: Supple, no thyromegally. No jugular venous distention. Respiratory:  Normal respiratory effort. Clear to auscultation, no Rales/Wheezes/Rhonchi. Cardiovascular: S1/S2 without murmurs, rubs or gallops. RRR  Abdomen: Soft, non-tender, bowel sounds present, non-distended  Musculoskeletal: No clubbing, cyanosis or edema bilaterally. Skin: Skin color, texture, turgor normal.  No rashes or lesions. Neurologic:  Cranial nerves: II-XII intact, ISABEL, No focal sensory/motor deficits        Intake/Output Summary (Last 24 hours) at 5/26/2019 1054  Last data filed at 5/25/2019 1422  Gross per 24 hour   Intake 3078 ml   Output --   Net 3078 ml       Labs:   Recent Labs     05/24/19  0642 05/25/19  0714 05/26/19  0646   WBC 2.6* 2.9* 3.9*   HGB 11.4* 10.9* 11.5*   HCT 34.4* 33.2* 34.0*    207 233      Recent Labs     05/24/19  0642 05/25/19  0714 05/26/19  0646    140 142   K 3.4* 4.1 4.2    109 107   CO2 20* 19* 20*   BUN 5* 4* 3*   CREATININE 0.5* 0.5* 0.5*   CALCIUM 8.5 8.6 8.8   PHOS 3.1 2.8  --      No results for input(s): CKTOTAL, TROPONINI in the last 72 hours.     Urinalysis:    Lab Results   Component Value Date    NITRU Negative 05/21/2019    WBCUA 6 05/21/2019    BACTERIA 1+ 05/21/2019    RBCUA 4 05/21/2019    BLOODU Negative 05/21/2019    SPECGRAV >1.030

## 2019-05-26 NOTE — DISCHARGE SUMMARY
Hospital Medicine Discharge Summary    Patient ID: Lidia Giang      Patient's PCP: [facility]    Admit Date: 5/21/2019     Discharge Date:   5/26/2019    Admitting Physician: Santiago oBnner MD     Discharge Physician: Santiago Bonner MD     Discharge Diagnoses: Active Hospital Problems    Diagnosis    Hypomagnesemia [E83.42]    SBO (small bowel obstruction) ([facility]) [K56.609]    Ileitis, terminal ([address] [K50.00]       The patient was seen and examined on day of discharge and this discharge summary is in conjunction with any daily progress note from day of discharge. Hospital Course: The patient is a 12-year-old lady with no significant past medical history who presented with complaints of mid abdominal pain of acute onset with associated nausea and vomiting in the setting of her normal bowel motion on the day of admission.  CT of the abdomen was concerning for probable terminal ileitis and small bowel obstruction. ddx- crohn's vs SBO         Principal Problem:    SBO (small bowel obstruction) ([address]): She required NG for decompression with significant symptom improvement and NG removed yesterday and started clears advanced to low fiber diet with tolerance. flatus with good bowel sounds. no abdominal pains. Discussed with General surgery. Will discharge home today with outpatient colonoscopy with GI. Active Problems:    Ileitis, terminal ([address]): -Continue antibiotic coverage with ciprofloxacin and Flagyl and will switch to oral today and cotinue to complete total of 10days. outpatient colonoscopy with GI.       Hypomagnesemia: replete and monitor        Resolved Problems:    Abdominal pain    Hypokalemia      Physical Exam Performed:     BP 97/61   Pulse 76   Temp 97.5 °F (36.4 °C) (Temporal)   Resp 16   Ht 5' 5\" (1.651 m)   Wt 130 lb (59 kg)   LMP 05/21/2019   SpO2 99%   BMI 21.63 kg/m²       General appearance:  No apparent distress, appears stated age and cooperative. HEENT:  Normal cephalic, atraumatic without obvious deformity. Pupils equal, round, and reactive to light. Extra ocular muscles intact. Conjunctivae/corneas clear. Neck: Supple, with full range of motion. No jugular venous distention. Trachea midline. Respiratory:  Normal respiratory effort. Clear to auscultation, bilaterally without Rales/Wheezes/Rhonchi. Cardiovascular:  Regular rate and rhythm with normal S1/S2 without murmurs, rubs or gallops. Abdomen: Soft, non-tender, non-distended with normal bowel sounds. Musculoskeletal:  No clubbing, cyanosis or edema bilaterally. Full range of motion without deformity. Skin: Skin color, texture, turgor normal.  No rashes or lesions. Neurologic:  Neurovascularly intact without any focal sensory/motor deficits. Cranial nerves: II-XII intact, grossly non-focal.  Psychiatric:  Alert and oriented, thought content appropriate, normal insight  Capillary Refill: Brisk,< 3 seconds   Peripheral Pulses: +2 palpable, equal bilaterally       Labs: For convenience and continuity at follow-up the following most recent labs are provided:      CBC:    Lab Results   Component Value Date    WBC 3.9 05/26/2019    HGB 11.5 05/26/2019    HCT 34.0 05/26/2019     05/26/2019       Renal:    Lab Results   Component Value Date     05/26/2019    K 4.2 05/26/2019     05/26/2019    CO2 20 05/26/2019    BUN 3 05/26/2019    CREATININE 0.5 05/26/2019    CALCIUM 8.8 05/26/2019    PHOS 2.8 05/25/2019         Significant Diagnostic Studies    Radiology:   XR ABDOMEN (2 VIEWS)   Final Result   Decreased small bowel dilation. XR ABDOMEN (2 VIEWS)   Final Result   Mildly decreased but persistent small bowel obstruction after nasogastric   tube placement, over the past 24 hours. Persistent partial high-grade small   bowel obstruction remains however. Much of the contrast has traversed past   the lesion in the terminal ileum demonstrated on the CT examination. Upon   review of the CT examination there is indeed a lesion of the terminal ileum   with associated stricture. This may be due to inflammatory bowel disease,   infectious process or neoplasm. XR ABDOMEN (2 VIEWS)   Final Result   No significant change in findings consistent with partial small bowel   obstruction. XR ABDOMEN (KUB) (SINGLE AP VIEW)   Final Result   Dilation of small bowel loops throughout the abdomen, also present on prior   studies 10 yesterday, to the level of the distal most ileum where there is   irregular narrowing of the lumen corresponding with at least moderate   terminal ileitis seen on CT with wall thickening and increased mucosal   enhancement. Dilation of the small bowel may be related to partial   obstruction and/or ileus. FL SMALL BOWEL FOLLOW THROUGH ONLY   Final Result   Subtotal colectomy history is noted. Distal small bowel loops are prominently dilated. No passage into the rectal   region is identified. More delayed follow-up abdominal x-ray is planned at 10 p.m. tonight. CT ABDOMEN PELVIS W IV CONTRAST Additional Contrast? None   Final Result   Disproportionate small bowel distention throughout the entire abdomen and the   pelvis, greatest in the pelvis. Disproportionate nature raises the question   of a small bowel obstruction. There is questionable wall thickening at the   terminal ileal level. This raises the question of terminal ileitis. There is also multifocal colonic incomplete distention. Additional   thickening in the colon would be difficult to exclude. Consults:     IP CONSULT TO HOSPITALIST  IP CONSULT TO GI  IP CONSULT TO GENERAL SURGERY    Disposition:  Home     Condition at Discharge: Stable    Discharge Instructions/Follow-up:    GI clinic in 2weeks to arrange for outpatient colonoscopy with GI.     Code Status:  Full Code     Activity: activity as tolerated    Diet: regular diet and with low fiber      Discharge Medications:     Current Discharge Medication List           Details   ciprofloxacin (CIPRO) 500 MG tablet Take 1 tablet by mouth every 12 hours for 10 days  Qty: 20 tablet, Refills: 0      metroNIDAZOLE (FLAGYL) 500 MG tablet Take 1 tablet by mouth every 8 hours for 10 days  Qty: 30 tablet, Refills: 0             Time Spent on discharge is more than 30 minutes in the examination, evaluation, counseling and review of medications and discharge plan. Signed: Magaly Peterson MD   5/26/2019      Thank you [facility] for the opportunity to be involved in this patient's care. If you have any questions or concerns please feel free to contact me at [phone].

## 2019-05-26 NOTE — PLAN OF CARE
Problem: Pain:  Goal: Pain level will decrease  Description  Pain level will decrease  5/26/2019 0905 by Brittny Saxena RN  Outcome: Ongoing     Problem:  Bowel/Gastric:  Goal: Bowel function will improve  Description  Bowel function will improve  5/26/2019 0905 by Brittny Saxena RN  Outcome: Ongoing

## 2019-05-26 NOTE — PROGRESS NOTES
D/C instructions and medications were reviewed with pt; scripts were electronically sent to [facility] in [place]; pt verbalized understanding of instructions and a copy was given to the pt; belongings were packed by the patient/family; Pt requested to ambulate self to private vehicle.

## 2019-05-26 NOTE — PROGRESS NOTES
PCA offered patient a bath and linen change, pt denied both and said she plans on leaving today. No further needs at this time.

## 2020-03-10 ENCOUNTER — OFFICE VISIT (OUTPATIENT)
Dept: INTERNAL MEDICINE CLINIC | Age: 25
End: 2020-03-10
Payer: COMMERCIAL

## 2020-03-10 VITALS
BODY MASS INDEX: 23.8 KG/M2 | SYSTOLIC BLOOD PRESSURE: 96 MMHG | HEART RATE: 104 BPM | DIASTOLIC BLOOD PRESSURE: 60 MMHG | OXYGEN SATURATION: 98 % | WEIGHT: 143 LBS

## 2020-03-10 PROCEDURE — 99203 OFFICE O/P NEW LOW 30 MIN: CPT | Performed by: INTERNAL MEDICINE

## 2020-03-10 SDOH — HEALTH STABILITY: MENTAL HEALTH: HOW MANY STANDARD DRINKS CONTAINING ALCOHOL DO YOU HAVE ON A TYPICAL DAY?: 3 OR 4

## 2020-03-10 SDOH — HEALTH STABILITY: MENTAL HEALTH: HOW OFTEN DO YOU HAVE A DRINK CONTAINING ALCOHOL?: 2-4 TIMES A MONTH

## 2020-03-10 ASSESSMENT — ENCOUNTER SYMPTOMS
SHORTNESS OF BREATH: 0
COUGH: 0
DIARRHEA: 0
ABDOMINAL PAIN: 1
BACK PAIN: 0
WHEEZING: 0
VOMITING: 0
NAUSEA: 0
SORE THROAT: 0
EYE REDNESS: 0
CHEST TIGHTNESS: 0
CONSTIPATION: 0
SINUS PRESSURE: 0
RHINORRHEA: 0

## 2020-03-10 ASSESSMENT — PATIENT HEALTH QUESTIONNAIRE - PHQ9
2. FEELING DOWN, DEPRESSED OR HOPELESS: 0
SUM OF ALL RESPONSES TO PHQ9 QUESTIONS 1 & 2: 0
SUM OF ALL RESPONSES TO PHQ QUESTIONS 1-9: 0
SUM OF ALL RESPONSES TO PHQ QUESTIONS 1-9: 0
1. LITTLE INTEREST OR PLEASURE IN DOING THINGS: 0

## 2020-03-10 NOTE — PROGRESS NOTES
Comment: [facility]    Social Needs    Financial resource strain: Not on file    Food insecurity     Worry: Not on file     Inability: Not on file    Transportation needs     Medical: Not on file     Non-medical: Not on file   Tobacco Use    Smoking status: Never Smoker    Smokeless tobacco: Never Used   Substance and Sexual Activity    Alcohol use: Yes     Frequency: 2-4 times a month     Drinks per session: 3 or 4     Binge frequency: Never    Drug use: Never    Sexual activity: Yes     Partners: Male   Lifestyle    Physical activity     Days per week: Not on file     Minutes per session: Not on file    Stress: Not on file   Relationships    Social connections     Talks on phone: Not on file     Gets together: Not on file     Attends [employer] service: Not on file     Active member of club or organization: Not on file     Attends meetings of clubs or organizations: Not on file     Relationship status: Not on file    Intimate partner violence     Fear of current or ex partner: Not on file     Emotionally abused: Not on file     Physically abused: Not on file     Forced sexual activity: Not on file   Other Topics Concern    Not on file   Social History Narrative    Lives with boyfriend, her daughter (5y), and step daughter (7yo)-[birth year] 8, 26        Family History   Problem Relation Age of Onset    No Known Problems Mother     No Known Problems Father     Cancer Maternal Grandmother     Diabetes Maternal Grandfather     No Known Problems Paternal Grandmother     No Known Problems Paternal Grandfather     No Known Problems Half-Sister     No Known Problems Half-Brother     No Known Problems Half-Sister     No Known Problems Half-Brother     No Known Problems Half-Brother          Review of Systems:  Review of Systems   Constitutional: Negative for fatigue and fever. HENT: Negative for ear pain, hearing loss, postnasal drip, rhinorrhea, sinus pressure, sore throat and tinnitus. Eyes: Negative for redness. Respiratory: Negative for cough, chest tightness, shortness of breath and wheezing. Cardiovascular: Negative for chest pain, palpitations and leg swelling. Gastrointestinal: Positive for abdominal pain. Negative for constipation, diarrhea, nausea and vomiting. Genitourinary: Negative for dysuria and frequency. Musculoskeletal: Negative for arthralgias, back pain and joint swelling. Skin: Positive for rash. Neurological: Negative for dizziness, syncope and headaches. Objective:    Vitals:    03/10/20 1058   BP: 96/60   Pulse: 104   SpO2: 98%   Weight: 143 lb (64.9 kg)     Body mass index is 23.8 kg/m². Physical Exam  Constitutional:       Appearance: She is well-developed. HENT:      Head: Atraumatic. Right Ear: Hearing, tympanic membrane, ear canal and external ear normal.      Left Ear: Hearing, tympanic membrane, ear canal and external ear normal.      Nose: Nose normal. No mucosal edema or rhinorrhea. Eyes:      General: No scleral icterus. Pupils: Pupils are equal, round, and reactive to light. Neck:      Thyroid: No thyroid mass or thyromegaly. Trachea: Trachea normal.   Cardiovascular:      Rate and Rhythm: Normal rate and regular rhythm. Heart sounds: Normal heart sounds, S1 normal and S2 normal. No murmur. Pulmonary:      Effort: Pulmonary effort is normal. No respiratory distress. Breath sounds: Normal breath sounds. No wheezing, rhonchi or rales. Abdominal:      General: Bowel sounds are normal.      Palpations: Abdomen is soft. Tenderness: There is no abdominal tenderness. Lymphadenopathy:      Cervical: No cervical adenopathy. Skin:     General: Skin is warm and dry. Findings: Rash (See photos) present. Neurological:      Mental Status: She is alert. Cranial Nerves: No cranial nerve deficit. Motor: No abnormal muscle tone. Gait: Gait normal.                     Assessment:    1. Dermatitis  Recommend referral to dermatology. Okay to use topical steroid if she develops pruritus, however discouraged use of topical steroids as it may impact dermatologic evaluation. - triamcinolone (KENALOG) 0.1 % ointment; Apply topically 2 times daily as needed (skin irritation) Apply topically 2 times daily. Dispense: 80 g; Refill: 5  - Patricia Clay MD, Dermatology, [facility]    2. IBD (inflammatory bowel disease)  Advised patient to follow-up with gastroenterology to discuss treatment options. She should continue to avoid dairy since this does make her symptoms worse. - triamcinolone (KENALOG) 0.1 % ointment; Apply topically 2 times daily as needed (skin irritation) Apply topically 2 times daily. Dispense: 80 g; Refill: 5  - Patricia Clay MD, Dermatology, [facility]       Plan/Patient Instructions:    Patient Instructions   Avoid dairy! Refer to Dermatology  Apply triamcinolone to irritated areas only    Reschedule with GI        Return in about 4 months (around 7/10/2020) for Well adult exam .      Ashley Pellet      Documentation was done using voice recognition dragon software. Every effort was made to ensure accuracy; however, inadvertent, unintentional computerized transcription errors may be present.

## 2020-03-10 NOTE — PATIENT INSTRUCTIONS
Avoid dairy!     Refer to Dermatology  Apply triamcinolone to irritated areas only    Reschedule with GI

## 2020-03-10 NOTE — Clinical Note
Hello! I have a patient with IBD and a non-specific papular rash. I referred her to you all. Thanks!  Ruffus Phlegm

## 2020-04-07 ENCOUNTER — VIRTUAL VISIT (OUTPATIENT)
Dept: DERMATOLOGY | Age: 25
End: 2020-04-07
Payer: COMMERCIAL

## 2020-04-07 PROCEDURE — 99243 OFF/OP CNSLTJ NEW/EST LOW 30: CPT | Performed by: DERMATOLOGY

## 2020-04-07 NOTE — PROGRESS NOTES
TELEHEALTH EVALUATION -- Audio/Visual (During [id number] public health emergency)    I have explained to Ms. Eduardo Weems that a Physical Examination is inherently limited by the nature of telemedicine and that this may lead to delayed or inadequate diagnosis. I also explained that she may require a higher level of care if a diagnosis can not be reasonably established with a virtual visit. Ms. Eduardo Weems has provided her Consent to proceed with a Virtual Visit today. Ms. Eduardo Weems was personally present on the video link with me today. This visit was completed virtually using the [employer] platform. Due to this being a TeleHealth encounter, evaluation of the following organ systems is limited: Vitals/Constitutional/EENT/Resp/CV/GI//MS/Neuro/Skin/Heme-Lymph-Imm. Patient's Name: Eduardo Weems  MRN: [id number]  YOB: 1995  Date of Visit: 4/7/2020  [facility] Provider: [facility]  Referring Provider: Ronnie Arcos MD    Subjective:     Chief Complaint   Patient presents with    New Patient    Rash       History of Present Illness:    Eduardo Weems a 30 y.o. female is a new patient who has requested an audio/video evaluation in consultation request by Ronnie Arcos MD  for evaluation of a rash . What is it: rash  Signs and symptoms: itching and rough  Location: Abdomen, breast, back, arms and legs. Severity: Severity now is 6/10. Modifying factors: Things that make this condition worse are showers. Things that make this condition better are none. Duration: This lesion has been present for 3 months. 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 She describes the rash as being itchy at times.     Similar rash in the past? Yes she labs. Will have patient scheduled first available appointment. In the mean time, we will prescribe Triamcinolone 0.1% ointment to be applied to the most bothersome areas and not to treat all areas, so if we decide on a biopsy, we will have an active lesion rather than a treated one to biopsy. Patient verbalized understanding and agrees with the plan    - The importance of continued surveillance was discussed. Monthly self examinations were encouraged. - Signs of cutaneous malignancy were discussed and they were encouraged to contact me if they note any evolving, bleeding, painful or non-healing lesions. ABCDEs of melanoma also reviewed. - Photoprotection was encouraged and written material on sunscreen provided. Follow up:  Return visit in 1 week or as needed for change in condition. All questions addressed. Procedure:   No procedure performed    I spent a minimum of 45 minutes interacting face-to-face with this patient via video. More than 50% of this time was spent counseling and coordinating care for the patient  regarding their disease(s) and therapies.  This included reviewing the patients chief complaint, reviewing and clarifying the patients present illness, reviewing and clarifying the patients past medical history, reviewing and clarifying the patients review of systems/social history/family history, reviewing and clarifying the patients current medications/drug allergies, reviewing and highlighting pertinent aspects of any available outside medical records, reviewing any available outside biopsy reports, examining the patients skin, discussing with the patient my specific diagnostic impressions and any relevant differential diagnoses, discussing with the patient the rationale for any lab tests or skin biopsies needed for further evaluation, ordering/performing any such needed tests or biopsies, discussing possible treatment strategies with the patient, discussing my specific treatment

## 2020-06-01 ENCOUNTER — TELEPHONE (OUTPATIENT)
Dept: DERMATOLOGY | Age: 25
End: 2020-06-01

## 2020-06-01 NOTE — TELEPHONE ENCOUNTER
Pt calling states she saw  on 4/7 want to know what diagnosis of her skin  told her she would like a call back @ [phone] to discuss

## 2020-06-12 ENCOUNTER — OFFICE VISIT (OUTPATIENT)
Dept: DERMATOLOGY | Age: 25
End: 2020-06-12
Payer: COMMERCIAL

## 2020-06-12 VITALS — TEMPERATURE: 98.4 F

## 2020-06-12 PROCEDURE — 99214 OFFICE O/P EST MOD 30 MIN: CPT | Performed by: DERMATOLOGY

## 2020-06-12 RX ORDER — TACROLIMUS 1 MG/G
OINTMENT TOPICAL
Qty: 60 G | Refills: 3 | Status: SHIPPED | OUTPATIENT
Start: 2020-06-12 | End: 2021-03-25

## 2020-06-12 NOTE — PROGRESS NOTES
topically 2 times daily. (Patient not taking: Reported on 6/12/2020) 80 g 5     No current facility-administered medications for this visit. Review of Systems:  Constitutional: No fevers, chills or recent illness. feels well   Skin: Skin:As per HPI AND otherwise no new, bleeding or symptomatic skin lesions      Objective:   Physical Examination:  General: alert, comfortable, no apparent distress, well-appearing  Psych: alert, oriented and pleasant  Neuro: oriented to person, place, and time  Skin: Areas examined: head including face, lips, conjunctiva and lids, neck, hair/scalp, chest, including breasts and axilla, abdomen, back, buttocks, right upper extremity, left upper extremity, right lower extremity, left lower extremity, left hand, right hand, digits and nails and patient declined genital exam      All areas examined were within normal limits except those listed below with the appropriate assessment and plan    Assessment and Plan (with relevant objective exam findings):     1. Lichenoid eruption with post inflammatory hyperpigmentation  Location: Inframammary, breast, antecubital fossa, volar wrists, popliteal fossa    Objective findings: few scattered brown patchy small flat topped papules and macules, some distributed in a linear configuration. No oral or nail changes noted    DDX: Lichen planus pigmentosus with  post inflammatory hyperpigmentation versus lichen planus versus syphilis. Discussed with the patient my differential diagnosis and suggested blood work for thyroid, Hep C and syphilis. Patient declined at this time. 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 It may be treated, rarely successfully with the following therapies; topical steroids, non steroidal topicals, NBUVB, methotrexate or dapsone.     After discussion of therapeutic options, decision was made to do the following:    -Will initiate treatment with Tacrolimus 0.1% ointment twice daily  -Strict sun protection        Follow up:  Return visit in 4 weeks or as needed for change in condition. All questions addressed.      Procedure:   No procedure performed                      Harlan Vigil MD. MS

## 2020-07-15 ENCOUNTER — TELEPHONE (OUTPATIENT)
Dept: DERMATOLOGY | Age: 25
End: 2020-07-15

## 2020-07-15 NOTE — TELEPHONE ENCOUNTER
Called pt and canceled appt for 7/16/20 , no pictures sent    She then stated her rx was too expensive and she had not picked the medication that was sent back in June. Advised her I would speak to Dr. Eusebio Huggins regarding her medication. Tried to schedule a follow up and pt refused stating she didn't have anything to show. I told her I would to Dr. Camden Hagan and she hung up the line    There are no messages regarding pts medication from her or pharmacy. I spoke to pt last week about her pictures and she never mention cost of medication as an issue.

## 2020-07-15 NOTE — TELEPHONE ENCOUNTER
Dr. Ronquillo Most patient    Patent called back.   She was talking to to you and was disconnected    Call back # [phone]

## 2020-07-20 NOTE — TELEPHONE ENCOUNTER
It does not need a pa, this patient has a high deductible plan  I did tell her there was a coupon on good rx but it was still $63   She states if there are no other cheaper alternatives she will try   To pay the $63

## 2020-11-13 ENCOUNTER — HOSPITAL ENCOUNTER (EMERGENCY)
Age: 25
Discharge: LEFT BEFORE DISCHARGE | End: 2020-11-13
Payer: COMMERCIAL

## 2020-11-20 ENCOUNTER — TELEPHONE (OUTPATIENT)
Dept: INTERNAL MEDICINE CLINIC | Age: 25
End: 2020-11-20

## 2020-11-20 ENCOUNTER — NURSE TRIAGE (OUTPATIENT)
Dept: OTHER | Facility: CLINIC | Age: 25
End: 2020-11-20

## 2020-11-20 NOTE — TELEPHONE ENCOUNTER
Reason for Disposition   MODERATE pain (e.g., symptoms interfere with work or school, limping) and present > 3 days    Answer Assessment - Initial Assessment Questions  1. LOCATION and RADIATION: \"Where is the pain located? \"       Left knee pain    2. QUALITY: \"What does the pain feel like? \"  (e.g., sharp, dull, aching, burning)      Achy - shoots down leg    3. SEVERITY: \"How bad is the pain? \" \"What does it keep you from doing? \"   (Scale 1-10; or mild, moderate, severe)    -  MILD (1-3): doesn't interfere with normal activities     -  MODERATE (4-7): interferes with normal activities (e.g., work or school) or awakens from sleep, limping     -  SEVERE (8-10): excruciating pain, unable to do any normal activities, unable to walk       hurts to bend leg - 6/10    4. ONSET: \"When did the pain start? \" \"Does it come and go, or is it there all the time? \"      Approx 1 months ago. 5. RECURRENT: \"Have you had this pain before? \" If so, ask: \"When, and what happened then? \"      Never had before. 6. SETTING: \"Has there been any recent work, exercise or other activity that involved that part of the body? \"       Denies injuries. 7. AGGRAVATING FACTORS: \"What makes the knee pain worse? \" (e.g., walking, climbing stairs, running)      Movement, bending stairs    8. ASSOCIATED SYMPTOMS: \"Is there any swelling or redness of the knee? \"     Swelling around left knee    9. OTHER SYMPTOMS: \"Do you have any other symptoms? \" (e.g., chest pain, difficulty breathing, fever, calf pain)      Knee pain, other joint pain at time. 10. PREGNANCY: \"Is there any chance you are pregnant? \" \"When was your last menstrual period? \"        LMP 9/12/2020 - recently had miscarriage. Protocols used: KNEE PAIN-ADULT-OH    Pt reports left knee pain and swelling for approx 1 month. Denies any injuries. Reviewed for pt to have appt with PCP within 3 days.  Pt requesting appt time after 4pm.     Warm transfer to [facility] [place] in [facility]    Caller provided care advice and instructed to call back with worsening symptoms. Attention Provider: Thank you for allowing me to participate in the care of your patient. The patient was connected to triage in response to information provided to the E[facility]. Please do not respond through this encounter as the response is not directed to a shared pool.

## 2020-12-01 NOTE — TELEPHONE ENCOUNTER
Pt called very upset that she didn't receive a call back for an appointment. Message conveyed that the MA tried to contact pt and mailbox was full, unable to l/m. Information for after hours ortho clinic given to pt.

## 2021-03-25 ENCOUNTER — OFFICE VISIT (OUTPATIENT)
Dept: FAMILY MEDICINE CLINIC | Age: 26
End: 2021-03-25

## 2021-03-25 VITALS
DIASTOLIC BLOOD PRESSURE: 60 MMHG | BODY MASS INDEX: 23.32 KG/M2 | OXYGEN SATURATION: 100 % | WEIGHT: 140 LBS | TEMPERATURE: 98.4 F | HEART RATE: 85 BPM | RESPIRATION RATE: 16 BRPM | SYSTOLIC BLOOD PRESSURE: 106 MMHG | HEIGHT: 65 IN

## 2021-03-25 DIAGNOSIS — K50.10 CROHN'S DISEASE OF COLON WITHOUT COMPLICATION (HCC): ICD-10-CM

## 2021-03-25 DIAGNOSIS — Z00.00 WELL ADULT EXAM: Primary | ICD-10-CM

## 2021-03-25 PROCEDURE — 99385 PREV VISIT NEW AGE 18-39: CPT | Performed by: NURSE PRACTITIONER

## 2021-03-25 SDOH — ECONOMIC STABILITY: FOOD INSECURITY: WITHIN THE PAST 12 MONTHS, YOU WORRIED THAT YOUR FOOD WOULD RUN OUT BEFORE YOU GOT MONEY TO BUY MORE.: NEVER TRUE

## 2021-03-25 SDOH — ECONOMIC STABILITY: FOOD INSECURITY: WITHIN THE PAST 12 MONTHS, THE FOOD YOU BOUGHT JUST DIDN'T LAST AND YOU DIDN'T HAVE MONEY TO GET MORE.: NEVER TRUE

## 2021-03-25 ASSESSMENT — ENCOUNTER SYMPTOMS
WHEEZING: 0
EYES NEGATIVE: 1
COUGH: 0
DIARRHEA: 0
BLOOD IN STOOL: 0
VOMITING: 0
CHEST TIGHTNESS: 0
CONSTIPATION: 0
NAUSEA: 0
ABDOMINAL PAIN: 0
SHORTNESS OF BREATH: 0

## 2021-03-25 ASSESSMENT — PATIENT HEALTH QUESTIONNAIRE - PHQ9
SUM OF ALL RESPONSES TO PHQ QUESTIONS 1-9: 0
SUM OF ALL RESPONSES TO PHQ QUESTIONS 1-9: 0
2. FEELING DOWN, DEPRESSED OR HOPELESS: 0
SUM OF ALL RESPONSES TO PHQ QUESTIONS 1-9: 0

## 2021-03-25 NOTE — PATIENT INSTRUCTIONS
[facility] [address]  [address] [facility], [address] [place] [address] Drive  Phone: [phone] [address]  [phone]    [address] [place] [address]  [phone]    Patient Education     Well Visit, Ages 25 to 48: Care Instructions  Overview     Well visits can help you stay healthy. Your doctor has checked your overall health and may have suggested ways to take good care of yourself. Your doctor also may have recommended tests. At home, you can help prevent illness with healthy eating, regular exercise, and other steps. Follow-up care is a key part of your treatment and safety. Be sure to make and go to all appointments, and call your doctor if you are having problems. It's also a good idea to know your test results and keep a list of the medicines you take. How can you care for yourself at home? · Get screening tests that you and your doctor decide on. Screening helps find diseases before any symptoms appear. · Eat healthy foods. Choose fruits, vegetables, whole grains, protein, and low-fat dairy foods. Limit fat, especially saturated fat. Reduce salt in your diet. · Limit alcohol. If you are a man, have no more than 2 drinks a day or 14 drinks a week. If you are a woman, have no more than 1 drink a day or 7 drinks a week. · Get at least 30 minutes of physical activity on most days of the week. Walking is a good choice. You also may want to do other activities, such as running, swimming, cycling, or playing tennis or team sports. Discuss any changes in your exercise program with your doctor. · Reach and stay at a healthy weight. This will lower your risk for many problems, such as obesity, diabetes, heart disease, and high blood pressure. · Do not smoke or allow others to smoke around you. If you need help quitting, talk to your doctor about stop-smoking programs and medicines. These can increase your chances of quitting for good.   · Care for your have an account, please click on the \"Sign Up Now\" link. Current as of: May 27, 2020               Content Version: 12.8  © 2006-2021 [employer]. Care instructions adapted under license by [facility] ([facility]). If you have questions about a medical condition or this instruction, always ask your healthcare professional. [address] any warranty or liability for your use of this information. Patient Education        Crohn's Disease: Care Instructions  Your Care Instructions     Crohn's disease is a lifelong inflammatory bowel disease. Parts of the digestive tract get swollen and irritated and may develop sores called ulcers. It usually occurs in the last part of the small intestine and the first part of the large intestine. The main symptoms of Crohn's disease are belly pain, diarrhea, fever, and weight loss. It sometimes causes problems with joints, eyes, or skin. Symptoms may be mild or severe. The disease can also go into remission (not be active). Bad attacks are often treated in the hospital with medicines and liquids through a tube in your vein (IV). Talk with your doctor about the best treatments for you. You may need medicines that help prevent or treat flare-ups. You may need surgery to remove part of your bowel if you have an abnormal opening in the bowel (fistula), an abscess, or an obstruction. Self-care can help reduce your symptoms. Follow-up care is a key part of your treatment and safety. Be sure to make and go to all appointments, and call your doctor if you are having problems. It's also a good idea to know your test results and keep a list of the medicines you take. How can you care for yourself at home? · Take your medicines exactly as prescribed. Call your doctor if you think you are having a problem with your medicine. You will get more details on the specific medicines your doctor prescribes.   · Do not take anti-inflammatory medicines, such as aspirin, ibuprofen (Advil, Motrin), or naproxen (Aleve). They may make your symptoms worse. Do not take any other medicines or herbal products without talking to your doctor first.  · Avoid foods that make your symptoms worse. These might include milk, alcohol, high-fiber foods, or spicy foods. · Eat a healthy diet. Make sure to get enough iron. Rectal bleeding may make you lose iron. Good sources of iron include beef, lentils, spinach, raisins, and iron-enriched breads and cereals. · Drink liquid meal replacements if your doctor recommends them. These are high in calories and contain vitamins and minerals. Severe symptoms may make it hard for your body to absorb vitamins and minerals. · Do not smoke. Smoking makes Crohn's disease worse. If you need help quitting, talk to your doctor about stop-smoking programs and medicines. These can increase your chances of quitting for good. · Seek support from friends and family to help cope with Crohn's disease. The illness can affect all parts of your life. Get counseling if you need it. When should you call for help? Call 911 anytime you think you may need emergency care. For example, call if:    · Your stools are maroon or very bloody.     · You passed out (lost consciousness). Call your doctor now or seek immediate medical care if:    · You are vomiting.     · You have new or worse belly pain.     · You have a fever.     · You cannot pass stools or gas.     · You have new or more blood in your stools. Watch closely for changes in your health, and be sure to contact your doctor if:    · You have new or worse symptoms.     · You are losing weight.     · You do not get better as expected. Where can you learn more? Go to https://TheInfoProskip.Clean Plates. org and sign in to your [employer] account. Enter 21 [phone] in the DailyWorth box to learn more about \"Crohn's Disease: Care Instructions. \"     If you do not have an account, please click on the \"Sign Up Now\" link. Current as of: April 15, 2020               Content Version: 12.8  © 6786-1952 [employer]. Care instructions adapted under license by [facility] ([facility]). If you have questions about a medical condition or this instruction, always ask your healthcare professional. [address] any warranty or liability for your use of this information. Patient Education        Diet for Inflammatory Bowel Disease: Care Instructions  Your Care Instructions     Crohn's disease and ulcerative colitis are types of inflammatory bowel disease. What you eat doesn't increase the inflammation that causes your disease. But some types of foods, such as high-fiber fruits and vegetables, may make your symptoms worse. No one diet is right for everyone with an inflammatory bowel disease. Foods that bother one person may not bother another. Your diet has to be tailored for you. Follow-up care is a key part of your treatment and safety. Be sure to make and go to all appointments, and call your doctor if you are having problems. It's also a good idea to know your test results and keep a list of the medicines you take. How can you care for yourself at home? · Keep a food diary. As soon as you know what foods make your symptoms worse, your doctor or dietitian can help you plan the right diet for you. · During a flare-up, avoid or reduce foods that make symptoms worse. ? Choose dairy products that are low in lactose, such as yogurt, lactose-reduced milk, and hard cheeses like cheddar. ? If you have fat in your stools, choose low-fat foods instead of high-fat ones. For instance, some cuts of red meat have a lot of fat. A low-fat choice would be lean beef (such as sirloin, top and bottom round, diane, or diet lean hamburger), poultry, or fish, such as cod.  ? Instead of frying foods, try baking or broiling them. ? Cook fruits and vegetables without hulls, skins, or seeds.   ? Try different ways of preparing fruits and vegetables, such as steaming, stewing, or baking. ? Peel and seed fresh fruits and vegetables if these bother you, or choose canned varieties. · Get the calories and nutrients you need. ? Eat a varied, nutritious diet that is high in calories and protein. ? Try eating 3 meals plus 2 or 3 snacks a day. It may be easier to get more calories if you spread your food intake throughout the day. ? Take vitamin and mineral supplements if your doctor recommends them. ? Try adding high-calorie liquid supplements, such as Ensure Plus or Boost Plus, if you have trouble keeping your weight up.  ? See your doctor or dietitian if your diet feels too limited or you are losing weight. · Make sure to get enough iron. Rectal bleeding may make you lose iron. Good sources of iron include:  ? Beef. ? Lentils. ? Spinach. ? Raisins. ? Iron-enriched breads and cereals. When should you call for help? Watch closely for changes in your health, and be sure to contact your doctor if you have any problems. Where can you learn more? Go to https://[employer].Fluidigm. org and sign in to your [employer] account. Enter L619 in the Chikka box to learn more about \"Diet for Inflammatory Bowel Disease: Care Instructions. \"     If you do not have an account, please click on the \"Sign Up Now\" link. Current as of: December 17, 2020               Content Version: 12.8  © 3977-5714 [employer]. Care instructions adapted under license by [facility] ([facility]). If you have questions about a medical condition or this instruction, always ask your healthcare professional. [address] any warranty or liability for your use of this information.

## 2021-03-25 NOTE — PROGRESS NOTES
3/25/2021    Wesley Barraza (:  1995) is a 27 y.o. female, here for evaluation of the following medical concerns:    Chief Complaint   Patient presents with    New Patient     Past medical, surgical, family and social history, as well as current medications and allergies, were reviewed and updated with the patient. Patient is here for annual physical.    Dental: up-to-date  Eye: up-to-date  Pap: up-to-date  Exercise: occasionally  Diet: regular diet  Work:  [employer]  Social:  Single, one child (10year old)    Patient arrives today for an annual physical.  She has no concerns at this time. she is in need of a physical for her work. Patient does mention she has a history of Crohn's disease. States she was diagnosed a few years ago and chose at that time to not seek treatment. She had concerns regarding options available and not knowing if these were safe. She denies symptoms today but states she does note that dairy and red sauce seem to be triggers for her symptoms. Review of Systems   Constitutional: Negative for chills, diaphoresis, fatigue and fever. HENT: Negative. Eyes: Negative. Respiratory: Negative for cough, chest tightness, shortness of breath and wheezing. Cardiovascular: Negative for chest pain, palpitations and leg swelling. Gastrointestinal: Negative for abdominal pain, blood in stool, constipation, diarrhea, nausea and vomiting. Genitourinary: Negative. Musculoskeletal: Negative. Skin: Negative. Neurological: Negative for dizziness, weakness, light-headedness and headaches. Psychiatric/Behavioral: Negative.         Prior to Visit Medications    Not on File      No Known Allergies    Past Medical History:   Diagnosis Date    Asthma     [facility]'s disease of large intestine with other complication ([facility] [address]     IBD (inflammatory bowel disease)     Small bowel obstruction ([facility] [address]      Past Surgical History:   Procedure Laterality Date    APPENDECTOMY  2017    COLONOSCOPY  08/15/2019    SKIN BIOPSY       Social History     Tobacco Use    Smoking status: Never Smoker    Smokeless tobacco: Never Used   Substance Use Topics    Alcohol use: Yes     Frequency: 2-4 times a month     Drinks per session: 3 or 4     Binge frequency: Never    Drug use: Never      Family History   Problem Relation Age of Onset    No Known Problems Mother     No Known Problems Father     Cancer Maternal Grandmother     Diabetes Maternal Grandfather     No Known Problems Paternal Grandmother     No Known Problems Paternal Grandfather     No Known Problems Half-Sister     No Known Problems Half-Brother     No Known Problems Half-Sister     No Known Problems Half-Brother     No Known Problems Half-Brother      Vitals:    03/25/21 1537   BP: 106/60   Pulse: 85   Resp: 16   Temp: 98.4 °F (36.9 °C)   SpO2: 100%   Weight: 140 lb (63.5 kg)   Height: 5' 5\" (1.651 m)     Estimated body mass index is 23.3 kg/m² as calculated from the following:    Height as of this encounter: 5' 5\" (1.651 m). Weight as of this encounter: 140 lb (63.5 kg). Physical Exam  Vitals signs and nursing note reviewed. Constitutional:       General: She is awake. She is not in acute distress. Appearance: Normal appearance. She is well-developed, well-groomed and normal weight. She is not ill-appearing, toxic-appearing or diaphoretic. HENT:      Head: Normocephalic and atraumatic. Right Ear: Tympanic membrane, ear canal and external ear normal.      Left Ear: Tympanic membrane, ear canal and external ear normal.      Nose: Nose normal.      Mouth/Throat:      Mouth: Mucous membranes are moist.      Pharynx: Oropharynx is clear. Eyes:      Extraocular Movements: Extraocular movements intact. Conjunctiva/sclera: Conjunctivae normal.      Pupils: Pupils are equal, round, and reactive to light.    Neck:      Musculoskeletal: Full passive range of motion without pain, normal range of motion and neck supple. Thyroid: No thyroid mass, thyromegaly or thyroid tenderness. Vascular: No carotid bruit. Cardiovascular:      Rate and Rhythm: Normal rate and regular rhythm. Heart sounds: Normal heart sounds, S1 normal and S2 normal. No murmur. Pulmonary:      Effort: Pulmonary effort is normal. No respiratory distress. Breath sounds: Normal breath sounds and air entry. Abdominal:      General: Abdomen is flat. Bowel sounds are normal.      Palpations: Abdomen is soft. Musculoskeletal:      Right lower leg: No edema. Left lower leg: No edema. Lymphadenopathy:      Head:      Right side of head: No submental, submandibular, tonsillar, preauricular or posterior auricular adenopathy. Left side of head: No submental, tonsillar, preauricular or posterior auricular adenopathy. Cervical: No cervical adenopathy. Upper Body:      Right upper body: No supraclavicular adenopathy. Left upper body: No supraclavicular adenopathy. Skin:     General: Skin is warm and dry. Capillary Refill: Capillary refill takes less than 2 seconds. Neurological:      General: No focal deficit present. Mental Status: She is alert and oriented to person, place, and time. GCS: GCS eye subscore is 4. GCS verbal subscore is 5. GCS motor subscore is 6. Psychiatric:         Attention and Perception: Attention normal.         Mood and Affect: Mood normal.         Speech: Speech normal.         Behavior: Behavior normal. Behavior is cooperative. Thought Content: Thought content normal.         Judgment: Judgment normal.       ASSESSMENT/PLAN:  1. Well adult exam    2. Crohn's disease of colon without complication ([facility] [address]  Discussed options for treatment and educated on importance of following up with GI  - [facility] - Escobar Jorge MD, Gastroenterology, [facility]  Information given to schedule appointment. Return in about 6 months (around 9/25/2021).

## 2021-05-06 ENCOUNTER — OFFICE VISIT (OUTPATIENT)
Dept: PRIMARY CARE CLINIC | Age: 26
End: 2021-05-06
Payer: MEDICARE

## 2021-05-06 DIAGNOSIS — Z20.828 EXPOSURE TO SARS-ASSOCIATED CORONAVIRUS: Primary | ICD-10-CM

## 2021-05-06 LAB — SARS-COV-2: NOT DETECTED

## 2021-05-06 PROCEDURE — 99211 OFF/OP EST MAY X REQ PHY/QHP: CPT | Performed by: NURSE PRACTITIONER

## 2021-05-06 NOTE — PATIENT INSTRUCTIONS
You have received a viral test for COVID-19. Below is education on quarantine per the CDC guidelines. For any symptoms, seek care from your PCP, call [phone] to establish care with a doctor, or go directly to an urgent care or the emergency room. Test results will take 2-7 days and will be sent to you in your [employer] account. If you test positive, you will be contacted via phone. If you test negative, the ONLY communication will be through [address] GO TO [employer] AND SIGN UP FOR [employer]  (LOWER LEFT OF THE HOME PAGE)  No test is 100%. If you have symptoms, you should follow the guidance of quarantine as previously stated. You can still be contagious if you have symptoms. Your [facility] will reach out to you if you have a positive result. They will provide you with a return to work date and note. If you were tested for a pre-op, then you should remain in quarantine until your procedure. How do I know if I need to be in quarantine? If you live in a community where COVID-19 is or might be spreading (currently, that is virtually everywhere in [facility])  Be alert for symptoms. Watch for fever, cough, shortness of breath, or other symptoms of COVID-19.  Take your temperature if symptoms develop.  Practice social distancing. Maintain 6 feet of distance from others and stay out of crowded places.  Follow CDC guidance if symptoms develop. If you feel healthy but:   Recently had close contact with a person with COVID-19 you need to Quarantine:   Stay home until 14 days after your last exposure.  Check your temperature twice a day and watch for symptoms of COVID-19.  If possible, stay away from people who are at higher-risk for getting very sick from COVID-19.   Stay Home and Monitor Your Health if you:   Have been diagnosed with COVID-19, or   Are waiting for test results, or   Have cough, fever, or shortness of breath, or symptoms of COVID-19      When You Can

## 2021-05-06 NOTE — PROGRESS NOTES
Jet Javier received a viral test for COVID-19. They were educated on isolation and quarantine as appropriate. For any symptoms, they were directed to seek care from their PCP, given contact information to establish with a doctor, directed to an urgent care or the emergency room.

## 2021-05-10 ENCOUNTER — HOSPITAL ENCOUNTER (OUTPATIENT)
Age: 26
Discharge: HOME | End: 2021-05-10
Payer: MEDICARE

## 2021-05-10 LAB
ALBUMIN SERPL-MCNC: 4.1 G/DL (ref 3.4–5)
ALP BLD-CCNC: 62 U/L (ref 40–129)
ALT SERPL-CCNC: 7 U/L (ref 10–40)
ANION GAP SERPL CALCULATED.3IONS-SCNC: 12 MMOL/L (ref 3–16)
AST SERPL-CCNC: 16 U/L (ref 15–37)
BASOPHILS ABSOLUTE: 0.1 K/UL (ref 0–0.2)
BASOPHILS RELATIVE PERCENT: 1.1 %
BILIRUB SERPL-MCNC: <0.2 MG/DL (ref 0–1)
BILIRUBIN DIRECT: <0.2 MG/DL (ref 0–0.3)
BILIRUBIN, INDIRECT: ABNORMAL MG/DL (ref 0–1)
BUN BLDV-MCNC: 10 MG/DL (ref 7–20)
CALCIUM SERPL-MCNC: 9.1 MG/DL (ref 8.3–10.6)
CHLORIDE BLD-SCNC: 106 MMOL/L (ref 99–110)
CO2: 23 MMOL/L (ref 21–32)
CREAT SERPL-MCNC: <0.5 MG/DL (ref 0.6–1.1)
EOSINOPHILS ABSOLUTE: 0.3 K/UL (ref 0–0.6)
EOSINOPHILS RELATIVE PERCENT: 4.3 %
GFR AFRICAN AMERICAN: >60
GFR NON-AFRICAN AMERICAN: >60
GLUCOSE BLD-MCNC: 93 MG/DL (ref 70–99)
HCT VFR BLD CALC: 34.4 % (ref 36–48)
HEMOGLOBIN: 11.3 G/DL (ref 12–16)
LYMPHOCYTES ABSOLUTE: 1.5 K/UL (ref 1–5.1)
LYMPHOCYTES RELATIVE PERCENT: 23.7 %
MCH RBC QN AUTO: 27.5 PG (ref 26–34)
MCHC RBC AUTO-ENTMCNC: 32.8 G/DL (ref 31–36)
MCV RBC AUTO: 83.6 FL (ref 80–100)
MONOCYTES ABSOLUTE: 0.5 K/UL (ref 0–1.3)
MONOCYTES RELATIVE PERCENT: 8.4 %
NEUTROPHILS ABSOLUTE: 3.9 K/UL (ref 1.7–7.7)
NEUTROPHILS RELATIVE PERCENT: 62.5 %
PDW BLD-RTO: 17.8 % (ref 12.4–15.4)
PLATELET # BLD: 291 K/UL (ref 135–450)
PMV BLD AUTO: 9.9 FL (ref 5–10.5)
POTASSIUM SERPL-SCNC: 4.3 MMOL/L (ref 3.5–5.1)
RBC # BLD: 4.12 M/UL (ref 4–5.2)
SODIUM BLD-SCNC: 141 MMOL/L (ref 136–145)
TOTAL PROTEIN: 7.3 G/DL (ref 6.4–8.2)
WBC # BLD: 6.2 K/UL (ref 4–11)

## 2021-05-10 PROCEDURE — 82652 VIT D 1 25-DIHYDROXY: CPT

## 2021-05-10 PROCEDURE — 85025 COMPLETE CBC W/AUTO DIFF WBC: CPT

## 2021-05-10 PROCEDURE — 80048 BASIC METABOLIC PNL TOTAL CA: CPT

## 2021-05-10 PROCEDURE — 80076 HEPATIC FUNCTION PANEL: CPT

## 2021-05-12 LAB — VITAMIN D 1,25-DIHYDROXY: 63.7 PG/ML (ref 19.9–79.3)

## 2021-07-09 ENCOUNTER — TELEPHONE (OUTPATIENT)
Dept: FAMILY MEDICINE CLINIC | Age: 26
End: 2021-07-09

## 2021-10-20 ENCOUNTER — OFFICE VISIT (OUTPATIENT)
Dept: FAMILY MEDICINE CLINIC | Age: 26
End: 2021-10-20
Payer: MEDICARE

## 2021-10-20 VITALS
WEIGHT: 128 LBS | SYSTOLIC BLOOD PRESSURE: 114 MMHG | HEART RATE: 98 BPM | OXYGEN SATURATION: 98 % | DIASTOLIC BLOOD PRESSURE: 60 MMHG | TEMPERATURE: 96.7 F | BODY MASS INDEX: 21.3 KG/M2

## 2021-10-20 DIAGNOSIS — M25.562 ACUTE PAIN OF LEFT KNEE: ICD-10-CM

## 2021-10-20 DIAGNOSIS — M25.572 ACUTE LEFT ANKLE PAIN: ICD-10-CM

## 2021-10-20 DIAGNOSIS — M25.50 ARTHRALGIA, UNSPECIFIED JOINT: ICD-10-CM

## 2021-10-20 DIAGNOSIS — M54.2 NECK PAIN ON LEFT SIDE: Primary | ICD-10-CM

## 2021-10-20 PROCEDURE — 99214 OFFICE O/P EST MOD 30 MIN: CPT | Performed by: FAMILY MEDICINE

## 2021-10-20 PROCEDURE — G8420 CALC BMI NORM PARAMETERS: HCPCS | Performed by: FAMILY MEDICINE

## 2021-10-20 PROCEDURE — G8427 DOCREV CUR MEDS BY ELIG CLIN: HCPCS | Performed by: FAMILY MEDICINE

## 2021-10-20 PROCEDURE — G8484 FLU IMMUNIZE NO ADMIN: HCPCS | Performed by: FAMILY MEDICINE

## 2021-10-20 PROCEDURE — 1036F TOBACCO NON-USER: CPT | Performed by: FAMILY MEDICINE

## 2021-10-20 RX ORDER — METHYLPREDNISOLONE 4 MG/1
TABLET ORAL
Qty: 1 KIT | Refills: 0 | Status: SHIPPED | OUTPATIENT
Start: 2021-10-20 | End: 2021-11-08 | Stop reason: SDUPTHER

## 2021-10-20 NOTE — PROGRESS NOTES
[address] Note    Date: 10/20/2021                                               Justus Nava:     Chief Complaint   Patient presents with    Joint Swelling     arthritis left knee and neck        HPI   Patient is here for ED follow-up. Was seen 3 days ago with complaint of left-sided neck pain that started 1-1/2 weeks earlier. Denies any injury or trauma. Pain is worse with movement. Was diagnosed with torticollis and treated with Ultram and Robaxin. Since leaving the ED, pain is about the same. Gets better with the medicine but comes back when it wears off after about 6 hours. Pt also has L knee and ankle pain starting 4 days ago. Denies injury. Pt reports getting joint pain in different places over the last several months. Denies injuries. Pt didn't have any pain problems besides Crohn's disease. Patient Active Problem List    Diagnosis Date Noted    Hypomagnesemia 05/24/2019    SBO (small bowel obstruction) ([address] 05/22/2019    Ileitis, terminal ([address] 05/22/2019    Abnormal CT of the abdomen        Past Medical History:   Diagnosis Date    Asthma     Crohn's disease of large intestine with other complication ([address]     IBD (inflammatory bowel disease)     Small bowel obstruction (HCC)        Current Outpatient Medications   Medication Sig Dispense Refill    methylPREDNISolone (MEDROL DOSEPACK) 4 MG tablet Take by mouth. 1 kit 0    Omega-3 Fatty Acids (FISH OIL) 1000 MG CAPS Take 1,000 mg by mouth daily      Flaxseed, Linseed, (FLAX SEED OIL PO) Take by mouth      Prenatal Vit-Fe Fumarate-FA (PRENATAL PO) Take by mouth daily      APPLE CIDER VINEGAR PO Take by mouth daily       No current facility-administered medications for this visit.        No Known Allergies    Review of Systems   No fever, no cough    Vitals:  /60   Pulse 98   Temp 96.7 °F (35.9 °C)   Wt 128 lb (58.1 kg)   SpO2 98%   BMI 21.30 kg/m²     Wt Readings from Last 3 Encounters: 10/20/21 128 lb (58.1 kg)   05/04/21 140 lb (63.5 kg)   03/25/21 140 lb (63.5 kg)        Physical Exam   General:  Well-appearing, NAD, alert, non-toxic  HEENT:  Normocephalic, atraumatic.  + Decreased range of motion with neck flexion extension due to pain and stiffness. CHEST/LUNGS: No dyspnea  EXTREMETIES: Normal movement of all extremities. No edema. No joint swelling.  + Tenderness palpation bilateral ankle malleoli. Full range of motion of left ankle. Negative drawer test.  SKIN:  No rash, no cellulitis, no bruising, no petechiae/purpura/vesicles/pustules/abscess  PSYCH:  A+O x 3; normal affect  NEURO:  GCS 15, CN2-12 grossly intact, no focal motor/sensory deficits, normal gait, normal speech      Assessment/Plan     64-year-old female with neck pain and knee and ankle pain. Likely musculoskeletal, though her recent history of polyarthralgia concerning for possible systemic disease. Will treat for now with Medrol Dosepak. Continue Robaxin and tramadol as needed. Check rheumatoid factor and REECE. We will also check Lyme titer. Will refer to rheumatology in case symptoms do not resolve in the next 1 to 2 weeks. Discussed with patient medication/s dosage, instructions, potential S/E, A/R and Drug Interaction  Tylenol or Motrin as needed for pain or fever  Advise to return here if worse or go to nearest ER  Encourage fluids  Pt discharged in stable condition at 18:53      1. Neck pain on left side  -     methylPREDNISolone (MEDROL DOSEPACK) 4 MG tablet; Take by mouth., Disp-1 kit, R-0Normal  2. Acute pain of left knee  -     methylPREDNISolone (MEDROL DOSEPACK) 4 MG tablet; Take by mouth., Disp-1 kit, R-0Normal  3. Acute left ankle pain  -     methylPREDNISolone (MEDROL DOSEPACK) 4 MG tablet; Take by mouth., Disp-1 kit, R-0Normal  4. Arthralgia, unspecified joint  -     REECE; Future  -     RHEUMATOID FACTOR;  Future  -     Renae Pedersen MD, Rheumatology, [facility]  -     Lyme Disease AB

## 2021-11-04 DIAGNOSIS — M25.572 ACUTE LEFT ANKLE PAIN: ICD-10-CM

## 2021-11-04 DIAGNOSIS — M25.562 ACUTE PAIN OF LEFT KNEE: ICD-10-CM

## 2021-11-04 DIAGNOSIS — M54.2 NECK PAIN ON LEFT SIDE: ICD-10-CM

## 2021-11-04 RX ORDER — METHYLPREDNISOLONE 4 MG/1
TABLET ORAL
Qty: 1 KIT | Refills: 0 | OUTPATIENT
Start: 2021-11-04

## 2021-11-04 NOTE — TELEPHONE ENCOUNTER
Medication:   Requested Prescriptions     Pending Prescriptions Disp Refills    methylPREDNISolone (MEDROL DOSEPACK) 4 MG tablet 1 kit 0     Sig: Take by mouth. Last Filled:  10/20/2021, 1, 0    Patient Phone Number: [phone] (home)     Last appt: 10/20/2021   Next appt: Visit date not found    Last OARRS: No flowsheet data found.

## 2021-11-08 DIAGNOSIS — M25.562 ACUTE PAIN OF LEFT KNEE: ICD-10-CM

## 2021-11-08 DIAGNOSIS — M54.2 NECK PAIN ON LEFT SIDE: ICD-10-CM

## 2021-11-08 DIAGNOSIS — M25.572 ACUTE LEFT ANKLE PAIN: ICD-10-CM

## 2021-11-08 RX ORDER — METHYLPREDNISOLONE 4 MG/1
TABLET ORAL
Qty: 1 KIT | Refills: 0 | Status: SHIPPED | OUTPATIENT
Start: 2021-11-08 | End: 2022-06-09 | Stop reason: SDUPTHER

## 2021-11-08 NOTE — TELEPHONE ENCOUNTER
Pt was prescribed this medication by Dr. Jw Farrell can we send him the request for a refill since [facility] isn't in the office. Pt has ran out of medication. Please advise. Rex Landis

## 2022-04-19 ENCOUNTER — TELEPHONE (OUTPATIENT)
Dept: FAMILY MEDICINE CLINIC | Age: 27
End: 2022-04-19

## 2022-04-19 DIAGNOSIS — K50.918 CROHN'S DISEASE WITH OTHER COMPLICATION, UNSPECIFIED GASTROINTESTINAL TRACT LOCATION (HCC): Primary | ICD-10-CM

## 2022-04-19 NOTE — TELEPHONE ENCOUNTER
----- Message from Cirilo Garcia sent at 4/19/2022  9:14 AM EDT -----  Subject: Referral Request    QUESTIONS   Reason for referral request? PT needs a new referral to a gastrologist.   Says that the last referral was to a provider that did not accept her   insurance. Has the physician seen you for this condition before? No   Preferred Specialist (if applicable)? Do you already have an appointment scheduled? No  Additional Information for Provider?   ---------------------------------------------------------------------------  --------------  CALL BACK INFO  What is the best way for the office to contact you? OK to leave message on   voicemail  Preferred Call Back Phone Number? [phone]  ---------------------------------------------------------------------------  --------------  SCRIPT ANSWERS  Relationship to Patient?  Self

## 2022-05-16 NOTE — TELEPHONE ENCOUNTER
----- Message from Lorenza Titus MD sent at 5/15/2022  6:35 PM CDT -----  Please notify the pt that her ultrasound shows some \"cystic changes\" in the endometrial lining. This can be polyps or hyperplasia. I recommend proceeding with EMB/ sono to sample the uterine lining.   Patient came in and dropped off a Employee Medical Statement For . Please have the provider complete the form and sign. Once completed please call patient to come . [phone]. The form has been scanned into media,the original placed on provider desk. Last seen 03/25/2021.  PLEASE advise

## 2022-05-18 ENCOUNTER — TELEPHONE (OUTPATIENT)
Dept: FAMILY MEDICINE CLINIC | Age: 27
End: 2022-05-18

## 2022-05-18 NOTE — TELEPHONE ENCOUNTER
I referred the patient to two locations. The first was to GI at [facility] but did not choose a provider because I do not know any of their providers. It was a general external referral.  The other was to Dr. Mark Erwin with [address] who is associated with [facility] but apparently they would not see her due to her insurance. Please print the external referral under \"other\" and fax to  GI for the patient please.

## 2022-05-18 NOTE — TELEPHONE ENCOUNTER
----- Message from Shmuel Hong sent at 5/18/2022  4:03 PM EDT -----  Subject: Message to Provider    QUESTIONS  Information for Provider? pt is calling because she was supposed to have a   referral sent over to a gastro  @ . Abebe Badillo was send the referral a   month ago.  says they havent received the referral and pt is wanting   some clarification.  ---------------------------------------------------------------------------  --------------  CALL BACK INFO  What is the best way for the office to contact you? OK to leave message on   voicemail  Preferred Call Back Phone Number? [phone]  ---------------------------------------------------------------------------  --------------  SCRIPT ANSWERS  Relationship to Patient?  Self

## 2022-05-19 NOTE — TELEPHONE ENCOUNTER
Patient returned call and was given information and verbalized understanding.   Patient will call office back w/name of GI

## 2022-05-19 NOTE — TELEPHONE ENCOUNTER
The patient called in and states the she really hates UC. The patient would like a referral to [facility] or to someplace that will accept her insurance.

## 2022-05-20 ENCOUNTER — TELEPHONE (OUTPATIENT)
Dept: FAMILY MEDICINE CLINIC | Age: 27
End: 2022-05-20

## 2022-05-20 NOTE — TELEPHONE ENCOUNTER
Pt is asking for pain medication to help manage khrons while she waits to see her Eufemia Garduno dr    Please send meds to pharmacy and call pt once complete or to let her know how she is to move forward

## 2022-06-08 ENCOUNTER — HOSPITAL ENCOUNTER (EMERGENCY)
Age: 27
Discharge: HOME | End: 2022-06-09
Attending: EMERGENCY MEDICINE
Payer: MEDICARE

## 2022-06-08 DIAGNOSIS — M25.562 ACUTE PAIN OF LEFT KNEE: ICD-10-CM

## 2022-06-08 DIAGNOSIS — M54.2 NECK PAIN ON LEFT SIDE: ICD-10-CM

## 2022-06-08 DIAGNOSIS — M25.572 ACUTE LEFT ANKLE PAIN: ICD-10-CM

## 2022-06-08 DIAGNOSIS — R10.33 PERIUMBILICAL ABDOMINAL PAIN: Primary | ICD-10-CM

## 2022-06-08 LAB
A/G RATIO: 1.2 (ref 1.1–2.2)
ALBUMIN SERPL-MCNC: 3.6 G/DL (ref 3.4–5)
ALP BLD-CCNC: 94 U/L (ref 40–129)
ALT SERPL-CCNC: 9 U/L (ref 10–40)
ANION GAP SERPL CALCULATED.3IONS-SCNC: 14 MMOL/L (ref 3–16)
AST SERPL-CCNC: 10 U/L (ref 15–37)
BASOPHILS ABSOLUTE: 0 K/UL (ref 0–0.2)
BASOPHILS RELATIVE PERCENT: 0.7 %
BILIRUB SERPL-MCNC: <0.2 MG/DL (ref 0–1)
BUN BLDV-MCNC: 12 MG/DL (ref 7–20)
CALCIUM SERPL-MCNC: 8.4 MG/DL (ref 8.3–10.6)
CHLORIDE BLD-SCNC: 105 MMOL/L (ref 99–110)
CO2: 20 MMOL/L (ref 21–32)
CREAT SERPL-MCNC: 0.6 MG/DL (ref 0.6–1.1)
EOSINOPHILS ABSOLUTE: 0.3 K/UL (ref 0–0.6)
EOSINOPHILS RELATIVE PERCENT: 5.8 %
GFR AFRICAN AMERICAN: >60
GFR NON-AFRICAN AMERICAN: >60
GLUCOSE BLD-MCNC: 93 MG/DL (ref 70–99)
HCG QUALITATIVE: NEGATIVE
HCT VFR BLD CALC: 31.1 % (ref 36–48)
HEMOGLOBIN: 10 G/DL (ref 12–16)
LACTIC ACID: 1 MMOL/L (ref 0.4–2)
LIPASE: 16 U/L (ref 13–60)
LYMPHOCYTES ABSOLUTE: 1.1 K/UL (ref 1–5.1)
LYMPHOCYTES RELATIVE PERCENT: 19.6 %
MAGNESIUM: 1.6 MG/DL (ref 1.8–2.4)
MCH RBC QN AUTO: 26.8 PG (ref 26–34)
MCHC RBC AUTO-ENTMCNC: 32.1 G/DL (ref 31–36)
MCV RBC AUTO: 83.5 FL (ref 80–100)
MONOCYTES ABSOLUTE: 0.7 K/UL (ref 0–1.3)
MONOCYTES RELATIVE PERCENT: 12.5 %
NEUTROPHILS ABSOLUTE: 3.5 K/UL (ref 1.7–7.7)
NEUTROPHILS RELATIVE PERCENT: 61.4 %
PDW BLD-RTO: 20.1 % (ref 12.4–15.4)
PLATELET # BLD: 360 K/UL (ref 135–450)
PMV BLD AUTO: 7.6 FL (ref 5–10.5)
POTASSIUM REFLEX MAGNESIUM: 3.1 MMOL/L (ref 3.5–5.1)
RBC # BLD: 3.73 M/UL (ref 4–5.2)
SODIUM BLD-SCNC: 139 MMOL/L (ref 136–145)
TOTAL PROTEIN: 6.5 G/DL (ref 6.4–8.2)
WBC # BLD: 5.7 K/UL (ref 4–11)

## 2022-06-08 PROCEDURE — 84703 CHORIONIC GONADOTROPIN ASSAY: CPT

## 2022-06-08 PROCEDURE — 83605 ASSAY OF LACTIC ACID: CPT

## 2022-06-08 PROCEDURE — 83735 ASSAY OF MAGNESIUM: CPT

## 2022-06-08 PROCEDURE — 36415 COLL VENOUS BLD VENIPUNCTURE: CPT

## 2022-06-08 PROCEDURE — 6360000002 HC RX W HCPCS: Performed by: EMERGENCY MEDICINE

## 2022-06-08 PROCEDURE — 83690 ASSAY OF LIPASE: CPT

## 2022-06-08 PROCEDURE — 99284 EMERGENCY DEPT VISIT MOD MDM: CPT

## 2022-06-08 PROCEDURE — 2580000003 HC RX 258: Performed by: EMERGENCY MEDICINE

## 2022-06-08 PROCEDURE — 96361 HYDRATE IV INFUSION ADD-ON: CPT

## 2022-06-08 PROCEDURE — 85025 COMPLETE CBC W/AUTO DIFF WBC: CPT

## 2022-06-08 PROCEDURE — 96374 THER/PROPH/DIAG INJ IV PUSH: CPT

## 2022-06-08 PROCEDURE — 6370000000 HC RX 637 (ALT 250 FOR IP): Performed by: EMERGENCY MEDICINE

## 2022-06-08 PROCEDURE — 96375 TX/PRO/DX INJ NEW DRUG ADDON: CPT

## 2022-06-08 PROCEDURE — 80053 COMPREHEN METABOLIC PANEL: CPT

## 2022-06-08 RX ORDER — ONDANSETRON 2 MG/ML
4 INJECTION INTRAMUSCULAR; INTRAVENOUS ONCE
Status: COMPLETED | OUTPATIENT
Start: 2022-06-08 | End: 2022-06-08

## 2022-06-08 RX ORDER — POTASSIUM CHLORIDE 20 MEQ/1
40 TABLET, EXTENDED RELEASE ORAL ONCE
Status: COMPLETED | OUTPATIENT
Start: 2022-06-08 | End: 2022-06-08

## 2022-06-08 RX ORDER — 0.9 % SODIUM CHLORIDE 0.9 %
1000 INTRAVENOUS SOLUTION INTRAVENOUS ONCE
Status: COMPLETED | OUTPATIENT
Start: 2022-06-08 | End: 2022-06-08

## 2022-06-08 RX ORDER — MORPHINE SULFATE 4 MG/ML
4 INJECTION, SOLUTION INTRAMUSCULAR; INTRAVENOUS ONCE
Status: COMPLETED | OUTPATIENT
Start: 2022-06-08 | End: 2022-06-08

## 2022-06-08 RX ADMIN — MORPHINE SULFATE 4 MG: 4 INJECTION INTRAVENOUS at 22:55

## 2022-06-08 RX ADMIN — SODIUM CHLORIDE 1000 ML: 9 INJECTION, SOLUTION INTRAVENOUS at 22:54

## 2022-06-08 RX ADMIN — POTASSIUM CHLORIDE 40 MEQ: 20 TABLET, EXTENDED RELEASE ORAL at 23:50

## 2022-06-08 RX ADMIN — ONDANSETRON 4 MG: 2 INJECTION INTRAMUSCULAR; INTRAVENOUS at 22:55

## 2022-06-08 ASSESSMENT — PAIN - FUNCTIONAL ASSESSMENT: PAIN_FUNCTIONAL_ASSESSMENT: 0-10

## 2022-06-08 ASSESSMENT — ENCOUNTER SYMPTOMS
BLOOD IN STOOL: 0
NAUSEA: 1
COUGH: 0
SHORTNESS OF BREATH: 0
DIARRHEA: 1
ABDOMINAL PAIN: 1
BACK PAIN: 0
COLOR CHANGE: 0
VOMITING: 0

## 2022-06-08 ASSESSMENT — PAIN SCALES - GENERAL
PAINLEVEL_OUTOF10: 10
PAINLEVEL_OUTOF10: 10

## 2022-06-09 VITALS
TEMPERATURE: 98.5 F | HEIGHT: 65 IN | RESPIRATION RATE: 16 BRPM | DIASTOLIC BLOOD PRESSURE: 85 MMHG | OXYGEN SATURATION: 99 % | SYSTOLIC BLOOD PRESSURE: 110 MMHG | HEART RATE: 85 BPM | BODY MASS INDEX: 19.99 KG/M2 | WEIGHT: 120 LBS

## 2022-06-09 LAB
BACTERIA: ABNORMAL /HPF
BILIRUBIN URINE: NEGATIVE
BLOOD, URINE: NEGATIVE
CLARITY: CLEAR
COLOR: YELLOW
EPITHELIAL CELLS, UA: 9 /HPF (ref 0–5)
GLUCOSE URINE: NEGATIVE MG/DL
HYALINE CASTS: 1 /LPF (ref 0–8)
KETONES, URINE: ABNORMAL MG/DL
LEUKOCYTE ESTERASE, URINE: ABNORMAL
MICROSCOPIC EXAMINATION: YES
NITRITE, URINE: NEGATIVE
PH UA: 6.5 (ref 5–8)
PROTEIN UA: NEGATIVE MG/DL
RBC UA: 4 /HPF (ref 0–4)
SPECIFIC GRAVITY UA: 1.02 (ref 1–1.03)
URINE TYPE: ABNORMAL
UROBILINOGEN, URINE: 1 E.U./DL
WBC UA: 2 /HPF (ref 0–5)

## 2022-06-09 PROCEDURE — 6370000000 HC RX 637 (ALT 250 FOR IP): Performed by: EMERGENCY MEDICINE

## 2022-06-09 PROCEDURE — 81001 URINALYSIS AUTO W/SCOPE: CPT

## 2022-06-09 RX ORDER — METHYLPREDNISOLONE 4 MG/1
TABLET ORAL
Qty: 1 KIT | Refills: 0 | Status: SHIPPED | OUTPATIENT
Start: 2022-06-09 | End: 2022-09-07 | Stop reason: HOSPADM

## 2022-06-09 RX ORDER — ONDANSETRON 4 MG/1
4 TABLET, FILM COATED ORAL EVERY 8 HOURS PRN
Qty: 12 TABLET | Refills: 0 | Status: SHIPPED | OUTPATIENT
Start: 2022-06-09

## 2022-06-09 RX ORDER — DICYCLOMINE HYDROCHLORIDE 10 MG/1
10 CAPSULE ORAL 4 TIMES DAILY PRN
Qty: 20 CAPSULE | Refills: 0 | Status: SHIPPED | OUTPATIENT
Start: 2022-06-09 | End: 2022-06-14

## 2022-06-09 RX ORDER — LANOLIN ALCOHOL/MO/W.PET/CERES
400 CREAM (GRAM) TOPICAL ONCE
Status: COMPLETED | OUTPATIENT
Start: 2022-06-09 | End: 2022-06-09

## 2022-06-09 RX ADMIN — Medication 400 MG: at 00:06

## 2022-06-09 NOTE — ED NOTES
Pt Discharged in stable condition, VSS, no signs of distress, discharge instructions and meds reviewed. Pt verbalizes understanding and states no further questions or concerns unaddressed.        Vania Justin RN  06/09/22 2358

## 2022-06-09 NOTE — ED PROVIDER NOTES
Emergency Department Provider Note  [facility] [address]  6/8/[facility]     Patient Identification  Key Espinoza is a 32 y.o. female    Chief Complaint  Abdominal Pain (pt states that she has been dx with crohns for 2 years now. pt states that she has been trying to deal with a flare up and diarrhea for the past 2 months. pt states some nausea )      Mode of Arrival  private car    HPI  (History provided by patient)  This is a 32 y.o. female with a PMH significant for Crohn's disease presented today for abdominal pain. Patient reports generalized diffuse abdominal pain that gradually got worse over the course of 2 months. She reports diarrhea that is nonbloody. She has nausea, no vomiting. No fever. No dysuria or urinary frequency. Patient denies chances of pregnancy. She currently does not see a GI specialist and when she called to establish care, first available appointment was in July. ROS  Review of Systems   Constitutional: Negative for chills and fever. HENT: Negative for congestion. Respiratory: Negative for cough and shortness of breath. Cardiovascular: Negative for chest pain. Gastrointestinal: Positive for abdominal pain, diarrhea and nausea. Negative for blood in stool and vomiting. Genitourinary: Negative for dysuria and frequency. Musculoskeletal: Negative for back pain and myalgias. Skin: Negative for color change. Neurological: Negative for syncope and speech difficulty. Psychiatric/Behavioral: Negative for confusion. I have reviewed the following nursing documentation:  Allergies: No Known Allergies    Past medical history:  has a past medical history of Asthma, Crohn's disease of large intestine with other complication ([address] IBD (inflammatory bowel disease), and Small bowel obstruction ([address] Past surgical history:  has a past surgical history that includes Appendectomy (2017);  Colonoscopy (08/15/2019); and skin biopsy. Home medications:   Prior to Admission medications    Medication Sig Start Date End Date Taking? Authorizing Provider   Omega-3 Fatty Acids (FISH OIL) 1000 MG CAPS Take 1,000 mg by mouth daily    Historical Provider, MD   Fltracee Linseed, (FLAX SEED OIL PO) Take by mouth    Historical Provider, MD   Prenatal Vit-Fe Fumarate-FA (PRENATAL PO) Take by mouth daily    Historical Provider, MD   APPLE CIDER VINEGAR PO Take by mouth daily    Historical Provider, MD       Social history:  reports that she has never smoked. She has never used smokeless tobacco. She reports current alcohol use. She reports that she does not use drugs. Family history:    Family History   Problem Relation Age of Onset    No Known Problems Mother     No Known Problems Father     Cancer Maternal Grandmother     Diabetes Maternal Grandfather     No Known Problems Paternal Grandmother     No Known Problems Paternal Grandfather     No Known Problems Half-Sister     No Known Problems Half-Brother     No Known Problems Half-Sister     No Known Problems Half-Brother     No Known Problems Half-Brother        Exam  ED Triage Vitals   BP Temp Temp Source Heart Rate Resp SpO2 Height Weight   06/08/22 2211 06/08/22 2217 06/08/22 2211 06/08/22 2211 06/08/22 2211 06/08/22 2211 06/08/22 2216 06/08/22 2216   107/77 98.5 °F (36.9 °C) Oral (!) 105 16 99 % 5' 5\" (1.651 m) 120 lb (54.4 kg)   Physical Exam  Vitals and nursing note reviewed. Constitutional:       General: She is not in acute distress. Appearance: Normal appearance. She is well-developed. She is not diaphoretic. HENT:      Head: Normocephalic and atraumatic. Eyes:      General: Lids are normal. No scleral icterus. Right eye: No discharge. Left eye: No discharge. Conjunctiva/sclera: Conjunctivae normal.   Neck:      Trachea: No tracheal deviation. Cardiovascular:      Rate and Rhythm: Regular rhythm. Tachycardia present.       Heart sounds: Normal heart sounds. No murmur heard. Pulmonary:      Effort: Pulmonary effort is normal. No respiratory distress. Breath sounds: Normal breath sounds. No stridor. No wheezing. Abdominal:      General: Bowel sounds are normal. There is no distension. Palpations: Abdomen is soft. There is no mass. Tenderness: There is abdominal tenderness in the periumbilical area. There is no right CVA tenderness, left CVA tenderness, guarding or rebound. Hernia: No hernia is present. Musculoskeletal:         General: No deformity. Cervical back: Neck supple. Skin:     General: Skin is warm and dry. Coloration: Skin is not pale. Findings: No rash. Neurological:      General: No focal deficit present. Mental Status: She is alert and oriented to person, place, and time. Cranial Nerves: No dysarthria or facial asymmetry. Motor: Motor function is intact. No tremor.    Psychiatric:         Speech: Speech normal.         Behavior: Behavior normal.           MDM/ED Course  ED Medication Orders (From admission, onward)    Start Ordered     Status Ordering Provider    06/09/22 0015 06/09/22 0002  magnesium oxide (MAG-OX) tablet 400 mg  ONCE         Last MAR action: Given - by Ford Slade on 06/09/22 at [address] [place] [address]    06/08/22 2345 06/08/22 2344  potassium chloride (KLOR-CON M) extended release tablet 40 mEq  ONCE         Last MAR action: Given - by Ford Slade on 06/08/22 at [facility]    06/08/22 2230 06/08/22 2220  0.9 % sodium chloride bolus  ONCE         Last MAR action: Stopped - by Ford Slade on 06/08/22 at [facility]    06/08/22 2230 06/08/22 2220  ondansetron (ZOFRAN) injection 4 mg  ONCE         Last MAR action: Given - by Elba Lesches on 06/08/22 at [facility] [address]    06/08/22 2230 06/08/22 2220  morphine injection 4 mg  ONCE         Last MAR action: Given - by Elba Lesches on 06/08/22 at [phone] CO Everywhere           Labs  Results for orders placed or performed during the hospital encounter of 06/08/22   CBC with Auto Differential   Result Value Ref Range    WBC 5.7 4.0 - 11.0 K/uL    RBC 3.73 (L) 4.00 - 5.20 M/uL    Hemoglobin 10.0 (L) 12.0 - 16.0 g/dL    Hematocrit 31.1 (L) 36.0 - 48.0 %    MCV 83.5 80.0 - 100.0 fL    MCH 26.8 26.0 - 34.0 pg    MCHC 32.1 31.0 - 36.0 g/dL    RDW 20.1 (H) 12.4 - 15.4 %    Platelets 116 499 - 125 K/uL    MPV 7.6 5.0 - 10.5 fL    Neutrophils % 61.4 %    Lymphocytes % 19.6 %    Monocytes % 12.5 %    Eosinophils % 5.8 %    Basophils % 0.7 %    Neutrophils Absolute 3.5 1.7 - 7.7 K/uL    Lymphocytes Absolute 1.1 1.0 - 5.1 K/uL    Monocytes Absolute 0.7 0.0 - 1.3 K/uL    Eosinophils Absolute 0.3 0.0 - 0.6 K/uL    Basophils Absolute 0.0 0.0 - 0.2 K/uL   Comprehensive Metabolic Panel w/ Reflex to MG   Result Value Ref Range    Sodium 139 136 - 145 mmol/L    Potassium reflex Magnesium 3.1 (L) 3.5 - 5.1 mmol/L    Chloride 105 99 - 110 mmol/L    CO2 20 (L) 21 - 32 mmol/L    Anion Gap 14 3 - 16    Glucose 93 70 - 99 mg/dL    BUN 12 7 - 20 mg/dL    CREATININE 0.6 0.6 - 1.1 mg/dL    GFR Non-African American >60 >60    GFR African American >60 >60    Calcium 8.4 8.3 - 10.6 mg/dL    Total Protein 6.5 6.4 - 8.2 g/dL    Albumin 3.6 3.4 - 5.0 g/dL    Albumin/Globulin Ratio 1.2 1.1 - 2.2    Total Bilirubin <0.2 0.0 - 1.0 mg/dL    Alkaline Phosphatase 94 40 - 129 U/L    ALT 9 (L) 10 - 40 U/L    AST 10 (L) 15 - 37 U/L   Lipase   Result Value Ref Range    Lipase 16.0 13.0 - 60.0 U/L   Urinalysis with Microscopic   Result Value Ref Range    Color, UA Yellow Straw/Yellow    Clarity, UA Clear Clear    Glucose, Ur Negative Negative mg/dL    Bilirubin Urine Negative Negative    Ketones, Urine TRACE (A) Negative mg/dL    Specific Gravity, UA 1.020 1.005 - 1.030    Blood, Urine Negative Negative    pH, UA 6.5 5.0 - 8.0    Protein, UA Negative Negative mg/dL    Urobilinogen, Urine 1.0 <2.0 E.U./dL Nitrite, Urine Negative Negative    Leukocyte Esterase, Urine TRACE (A) Negative    Microscopic Examination YES     Urine Type Voided     Bacteria, UA None Seen None Seen /HPF    Hyaline Casts, UA 1 0 - 8 /LPF    WBC, UA 2 0 - 5 /HPF    RBC, UA 4 0 - 4 /HPF    Epithelial Cells, UA 9 (H) 0 - 5 /HPF   HCG Qualitative, Serum   Result Value Ref Range    hCG Qual Negative Detects HCG level >10 MIU/mL   Lactic Acid   Result Value Ref Range    Lactic Acid 1.0 0.4 - 2.0 mmol/L   Magnesium   Result Value Ref Range    Magnesium 1.60 (L) 1.80 - 2.40 mg/dL         - Patient seen and evaluated in room triage bay 3 and reassessed in room 25.  32 y.o. female presented for abdominal pain x 2 months that she states is consistent with a Crohn flare. Exam showed periumbilical tenderness without peritoneal signs.   - Patient was placed on telemetry during his/her ED stay and no malignant dysrhythmia observed. - Diagnostic studies reviewed. Potassium low at 3.1, consistent with report of nausea and vomiting. Potassium supplemented. Due to hypokalemia, magnesium level checked and that was also low. I also supplemented magnesium level. On reevaluation, patient reports she felt better. We agreed to treat as Crohn's flare with referral to GI. We will provide symptomatic control with Bentyl, nausea medication, and a Medrol Dosepak. - Return precautions also discussed. patient verbalized understanding of care plan and agreed to follow-up with PCP as advised. Clinical Impression:  1. Periumbilical abdominal pain          Disposition:  Discharge to home in good condition. Blood pressure 110/85, pulse 85, temperature 98.5 °F (36.9 °C), resp. rate 16, height 5' 5\" (1.651 m), weight 120 lb (54.4 kg), last menstrual period 05/21/2022, SpO2 99 %. Patient was given scripts for the following medications. I counseled patient how to take these medications.    Discharge Medication List as of 6/9/2022 12:45 AM      START taking these medications    Details   ondansetron (ZOFRAN) 4 MG tablet Take 1 tablet by mouth every 8 hours as needed for Nausea or Vomiting, Disp-12 tablet, R-0Normal      dicyclomine (BENTYL) 10 MG capsule Take 1 capsule by mouth 4 times daily as needed (abd pain), Disp-20 capsule, R-0Normal           Discharge Medication List as of 6/9/2022 12:45 AM      CONTINUE these medications which have CHANGED    Details   methylPREDNISolone (MEDROL DOSEPACK) 4 MG tablet Take by mouth., Disp-1 kit, R-0Normal              Disposition referral (if applicable):  Darlin Leija MD  [facility]   [address] [place] [address]  [phone]    Schedule an appointment as soon as possible for a visit   GI specialist    FABIO Jade - CNP  [address]  [phone]    Schedule an appointment as soon as possible for a visit in 3 days         This chart was generated in part by using Dragon Dictation system and may contain errors related to that system including errors in grammar, punctuation, and spelling, as well as words and phrases that may be inappropriate. If there are any questions or concerns please feel free to contact the dictating provider for clarification.      Dg Villasenor MD  [facility] Tamara Fowler MD  06/09/22 [id number]

## 2022-07-26 ENCOUNTER — APPOINTMENT (OUTPATIENT)
Dept: CT IMAGING | Age: 27
DRG: 245 | End: 2022-07-26
Payer: MEDICARE

## 2022-07-26 ENCOUNTER — HOSPITAL ENCOUNTER (INPATIENT)
Age: 27
LOS: 1 days | Discharge: LEFT BEFORE DISCHARGE | DRG: 245 | End: 2022-07-26
Attending: HOSPITALIST | Admitting: HOSPITALIST
Payer: MEDICARE

## 2022-07-26 VITALS
WEIGHT: 125 LBS | HEIGHT: 65 IN | BODY MASS INDEX: 20.83 KG/M2 | HEART RATE: 126 BPM | RESPIRATION RATE: 18 BRPM | DIASTOLIC BLOOD PRESSURE: 72 MMHG | OXYGEN SATURATION: 100 % | TEMPERATURE: 98.5 F | SYSTOLIC BLOOD PRESSURE: 101 MMHG

## 2022-07-26 DIAGNOSIS — K50.012 TERMINAL ILEITIS, WITH INTESTINAL OBSTRUCTION (HCC): Primary | ICD-10-CM

## 2022-07-26 LAB
A/G RATIO: 1.2 (ref 1.1–2.2)
ALBUMIN SERPL-MCNC: 4.2 G/DL (ref 3.4–5)
ALP BLD-CCNC: 98 U/L (ref 40–129)
ALT SERPL-CCNC: 62 U/L (ref 10–40)
ANION GAP SERPL CALCULATED.3IONS-SCNC: 15 MMOL/L (ref 3–16)
AST SERPL-CCNC: 17 U/L (ref 15–37)
BACTERIA: ABNORMAL /HPF
BASOPHILS ABSOLUTE: 0 K/UL (ref 0–0.2)
BASOPHILS RELATIVE PERCENT: 0.2 %
BILIRUB SERPL-MCNC: 0.7 MG/DL (ref 0–1)
BILIRUBIN URINE: ABNORMAL
BLOOD, URINE: NEGATIVE
BUN BLDV-MCNC: 26 MG/DL (ref 7–20)
C-REACTIVE PROTEIN: 34.9 MG/L (ref 0–5.1)
CALCIUM SERPL-MCNC: 9.2 MG/DL (ref 8.3–10.6)
CHLORIDE BLD-SCNC: 100 MMOL/L (ref 99–110)
CLARITY: ABNORMAL
CO2: 21 MMOL/L (ref 21–32)
COLOR: ABNORMAL
CREAT SERPL-MCNC: 0.8 MG/DL (ref 0.6–1.1)
EOSINOPHILS ABSOLUTE: 0 K/UL (ref 0–0.6)
EOSINOPHILS RELATIVE PERCENT: 0.3 %
GFR AFRICAN AMERICAN: >60
GFR NON-AFRICAN AMERICAN: >60
GLUCOSE BLD-MCNC: 111 MG/DL (ref 70–99)
GLUCOSE URINE: NEGATIVE MG/DL
HCG QUALITATIVE: NEGATIVE
HCT VFR BLD CALC: 42.3 % (ref 36–48)
HEMOGLOBIN: 13.6 G/DL (ref 12–16)
HYALINE CASTS: ABNORMAL /LPF (ref 0–2)
KETONES, URINE: NEGATIVE MG/DL
LACTIC ACID: 2.3 MMOL/L (ref 0.4–2)
LEUKOCYTE ESTERASE, URINE: NEGATIVE
LIPASE: 16 U/L (ref 13–60)
LYMPHOCYTES ABSOLUTE: 1.5 K/UL (ref 1–5.1)
LYMPHOCYTES RELATIVE PERCENT: 17.6 %
MCH RBC QN AUTO: 30.1 PG (ref 26–34)
MCHC RBC AUTO-ENTMCNC: 32.2 G/DL (ref 31–36)
MCV RBC AUTO: 93.4 FL (ref 80–100)
MICROSCOPIC EXAMINATION: YES
MONOCYTES ABSOLUTE: 1.2 K/UL (ref 0–1.3)
MONOCYTES RELATIVE PERCENT: 14 %
MUCUS: ABNORMAL /LPF
NEUTROPHILS ABSOLUTE: 5.7 K/UL (ref 1.7–7.7)
NEUTROPHILS RELATIVE PERCENT: 67.9 %
NITRITE, URINE: POSITIVE
PDW BLD-RTO: 21.9 % (ref 12.4–15.4)
PH UA: 6 (ref 5–8)
PLATELET # BLD: 382 K/UL (ref 135–450)
PMV BLD AUTO: 7.8 FL (ref 5–10.5)
POTASSIUM REFLEX MAGNESIUM: 3.8 MMOL/L (ref 3.5–5.1)
PROTEIN UA: 30 MG/DL
RBC # BLD: 4.53 M/UL (ref 4–5.2)
RBC UA: ABNORMAL /HPF (ref 0–4)
SEDIMENTATION RATE, ERYTHROCYTE: >130 MM/HR (ref 0–20)
SODIUM BLD-SCNC: 136 MMOL/L (ref 136–145)
SPECIFIC GRAVITY UA: >=1.03 (ref 1–1.03)
TOTAL PROTEIN: 7.6 G/DL (ref 6.4–8.2)
URINE REFLEX TO CULTURE: ABNORMAL
URINE TYPE: ABNORMAL
UROBILINOGEN, URINE: 0.2 E.U./DL
WBC # BLD: 8.5 K/UL (ref 4–11)
WBC UA: ABNORMAL /HPF (ref 0–5)

## 2022-07-26 PROCEDURE — 80053 COMPREHEN METABOLIC PANEL: CPT

## 2022-07-26 PROCEDURE — 86140 C-REACTIVE PROTEIN: CPT

## 2022-07-26 PROCEDURE — 85652 RBC SED RATE AUTOMATED: CPT

## 2022-07-26 PROCEDURE — 83690 ASSAY OF LIPASE: CPT

## 2022-07-26 PROCEDURE — 96361 HYDRATE IV INFUSION ADD-ON: CPT

## 2022-07-26 PROCEDURE — 96374 THER/PROPH/DIAG INJ IV PUSH: CPT

## 2022-07-26 PROCEDURE — 84703 CHORIONIC GONADOTROPIN ASSAY: CPT

## 2022-07-26 PROCEDURE — APPNB30 APP NON BILLABLE TIME 0-30 MINS: Performed by: NURSE PRACTITIONER

## 2022-07-26 PROCEDURE — 2580000003 HC RX 258: Performed by: PHYSICIAN ASSISTANT

## 2022-07-26 PROCEDURE — 96376 TX/PRO/DX INJ SAME DRUG ADON: CPT

## 2022-07-26 PROCEDURE — 99285 EMERGENCY DEPT VISIT HI MDM: CPT

## 2022-07-26 PROCEDURE — 1200000000 HC SEMI PRIVATE

## 2022-07-26 PROCEDURE — 83605 ASSAY OF LACTIC ACID: CPT

## 2022-07-26 PROCEDURE — 81001 URINALYSIS AUTO W/SCOPE: CPT

## 2022-07-26 PROCEDURE — 85025 COMPLETE CBC W/AUTO DIFF WBC: CPT

## 2022-07-26 PROCEDURE — 6360000004 HC RX CONTRAST MEDICATION: Performed by: PHYSICIAN ASSISTANT

## 2022-07-26 PROCEDURE — 74177 CT ABD & PELVIS W/CONTRAST: CPT

## 2022-07-26 PROCEDURE — 96375 TX/PRO/DX INJ NEW DRUG ADDON: CPT

## 2022-07-26 PROCEDURE — 6360000002 HC RX W HCPCS: Performed by: PHYSICIAN ASSISTANT

## 2022-07-26 PROCEDURE — 99253 IP/OBS CNSLTJ NEW/EST LOW 45: CPT | Performed by: SURGERY

## 2022-07-26 RX ORDER — SODIUM CHLORIDE 9 MG/ML
INJECTION, SOLUTION INTRAVENOUS PRN
Status: CANCELLED | OUTPATIENT
Start: 2022-07-26

## 2022-07-26 RX ORDER — FENTANYL CITRATE 50 UG/ML
50 INJECTION, SOLUTION INTRAMUSCULAR; INTRAVENOUS ONCE
Status: COMPLETED | OUTPATIENT
Start: 2022-07-26 | End: 2022-07-26

## 2022-07-26 RX ORDER — ACETAMINOPHEN 650 MG/1
650 SUPPOSITORY RECTAL EVERY 6 HOURS PRN
Status: CANCELLED | OUTPATIENT
Start: 2022-07-26

## 2022-07-26 RX ORDER — SODIUM CHLORIDE 9 MG/ML
INJECTION, SOLUTION INTRAVENOUS CONTINUOUS
Status: CANCELLED | OUTPATIENT
Start: 2022-07-26

## 2022-07-26 RX ORDER — SODIUM CHLORIDE 0.9 % (FLUSH) 0.9 %
5-40 SYRINGE (ML) INJECTION PRN
Status: CANCELLED | OUTPATIENT
Start: 2022-07-26

## 2022-07-26 RX ORDER — ONDANSETRON 4 MG/1
4 TABLET, ORALLY DISINTEGRATING ORAL EVERY 8 HOURS PRN
Status: CANCELLED | OUTPATIENT
Start: 2022-07-26

## 2022-07-26 RX ORDER — ACETAMINOPHEN 325 MG/1
650 TABLET ORAL EVERY 6 HOURS PRN
Status: CANCELLED | OUTPATIENT
Start: 2022-07-26

## 2022-07-26 RX ORDER — 0.9 % SODIUM CHLORIDE 0.9 %
1000 INTRAVENOUS SOLUTION INTRAVENOUS ONCE
Status: COMPLETED | OUTPATIENT
Start: 2022-07-26 | End: 2022-07-26

## 2022-07-26 RX ORDER — SODIUM CHLORIDE 9 MG/ML
INJECTION, SOLUTION INTRAVENOUS CONTINUOUS
Status: DISCONTINUED | OUTPATIENT
Start: 2022-07-26 | End: 2022-07-26 | Stop reason: HOSPADM

## 2022-07-26 RX ORDER — ONDANSETRON 2 MG/ML
4 INJECTION INTRAMUSCULAR; INTRAVENOUS EVERY 6 HOURS PRN
Status: CANCELLED | OUTPATIENT
Start: 2022-07-26

## 2022-07-26 RX ORDER — ENOXAPARIN SODIUM 100 MG/ML
40 INJECTION SUBCUTANEOUS DAILY
Status: CANCELLED | OUTPATIENT
Start: 2022-07-26

## 2022-07-26 RX ORDER — ONDANSETRON 2 MG/ML
4 INJECTION INTRAMUSCULAR; INTRAVENOUS ONCE
Status: COMPLETED | OUTPATIENT
Start: 2022-07-26 | End: 2022-07-26

## 2022-07-26 RX ORDER — MORPHINE SULFATE 2 MG/ML
2 INJECTION, SOLUTION INTRAMUSCULAR; INTRAVENOUS EVERY 4 HOURS PRN
Status: CANCELLED | OUTPATIENT
Start: 2022-07-26

## 2022-07-26 RX ORDER — METHYLPREDNISOLONE SODIUM SUCCINATE 40 MG/ML
20 INJECTION, POWDER, LYOPHILIZED, FOR SOLUTION INTRAMUSCULAR; INTRAVENOUS EVERY 8 HOURS
Status: DISCONTINUED | OUTPATIENT
Start: 2022-07-26 | End: 2022-07-26 | Stop reason: HOSPADM

## 2022-07-26 RX ORDER — METHYLPREDNISOLONE SODIUM SUCCINATE 125 MG/2ML
125 INJECTION, POWDER, LYOPHILIZED, FOR SOLUTION INTRAMUSCULAR; INTRAVENOUS ONCE
Status: COMPLETED | OUTPATIENT
Start: 2022-07-26 | End: 2022-07-26

## 2022-07-26 RX ORDER — POLYETHYLENE GLYCOL 3350 17 G/17G
17 POWDER, FOR SOLUTION ORAL DAILY PRN
Status: CANCELLED | OUTPATIENT
Start: 2022-07-26

## 2022-07-26 RX ORDER — SODIUM CHLORIDE 0.9 % (FLUSH) 0.9 %
5-40 SYRINGE (ML) INJECTION EVERY 12 HOURS SCHEDULED
Status: CANCELLED | OUTPATIENT
Start: 2022-07-26

## 2022-07-26 RX ADMIN — IOPAMIDOL 50 ML: 755 INJECTION, SOLUTION INTRAVENOUS at 12:10

## 2022-07-26 RX ADMIN — FENTANYL CITRATE 50 MCG: 50 INJECTION, SOLUTION INTRAMUSCULAR; INTRAVENOUS at 11:29

## 2022-07-26 RX ADMIN — METHYLPREDNISOLONE SODIUM SUCCINATE 125 MG: 125 INJECTION, POWDER, FOR SOLUTION INTRAMUSCULAR; INTRAVENOUS at 12:59

## 2022-07-26 RX ADMIN — ONDANSETRON 4 MG: 2 INJECTION INTRAMUSCULAR; INTRAVENOUS at 11:29

## 2022-07-26 RX ADMIN — SODIUM CHLORIDE 1000 ML: 9 INJECTION, SOLUTION INTRAVENOUS at 11:29

## 2022-07-26 RX ADMIN — FENTANYL CITRATE 50 MCG: 50 INJECTION INTRAMUSCULAR; INTRAVENOUS at 13:25

## 2022-07-26 ASSESSMENT — ENCOUNTER SYMPTOMS
CHEST TIGHTNESS: 0
BACK PAIN: 0
COUGH: 0
RECTAL PAIN: 0
APNEA: 0
COLOR CHANGE: 0
BLOOD IN STOOL: 0
EYE ITCHING: 0
SHORTNESS OF BREATH: 0
ABDOMINAL DISTENTION: 1
ANAL BLEEDING: 0
DIARRHEA: 1
CONSTIPATION: 0
EYE DISCHARGE: 0
VOMITING: 1
NAUSEA: 1
RESPIRATORY NEGATIVE: 1
ABDOMINAL PAIN: 1

## 2022-07-26 ASSESSMENT — PAIN - FUNCTIONAL ASSESSMENT: PAIN_FUNCTIONAL_ASSESSMENT: 0-10

## 2022-07-26 ASSESSMENT — PAIN DESCRIPTION - PAIN TYPE: TYPE: ACUTE PAIN

## 2022-07-26 ASSESSMENT — PAIN SCALES - GENERAL: PAINLEVEL_OUTOF10: 10

## 2022-07-26 ASSESSMENT — PAIN DESCRIPTION - LOCATION: LOCATION: ABDOMEN

## 2022-07-26 ASSESSMENT — PAIN DESCRIPTION - DESCRIPTORS: DESCRIPTORS: SQUEEZING

## 2022-07-26 ASSESSMENT — PAIN DESCRIPTION - ORIENTATION: ORIENTATION: MID

## 2022-07-26 NOTE — ED NOTES
This RN at bedside. Pt resting comfortably after medication was given. Bed low and locked. Pt states no further needs at this time.      Erika Wakefield RN  07/26/22 0756

## 2022-07-26 NOTE — H&P
HOSPITALISTS HISTORY AND PHYSICAL    7/26/2022 1:56 PM    Patient Information:  Lisa Brooks is a 32 y.o. female [id number]  PCP:  FABIO Aguayo CNP (Tel: [phone] )    Chief complaint:    Chief Complaint   Patient presents with    Abdominal Pain     C/o abd pain with n/v/d, symptoms started last night. Hx Crohn's      History of Present Illness:  Celso Gimenez is a 32 y.o. female who presented with nausea vomiting abdominal pain. Onset of symptoms last night. Patient with history of Crohn's disease. Patient not passing any gas or bowel movement for abdominal is distended. Has history of condition creasingly admission to [facility] 3 weeks ago for small bowel obstruction at the time was treated nonoperatively with NG tube decompression. Patient said since then last night abdominal distention more with diarrhea. Nothing that makes it better or worse. Rates pain 10 out of 10. REVIEW OF SYSTEMS:   Constitutional: Negative for fever,chills or night sweats  ENT: Negative for rhinorrhea, epistaxis, hoarseness, sore throat. Respiratory: Negative for shortness of breath,wheezing  Cardiovascular: Negative for chest pain, palpitations   Gastrointestinal: Negative for nausea, vomiting, diarrhea  Genitourinary: Negative for polyuria, dysuria   Hematologic/Lymphatic: Negative for bleeding tendency, easy bruising  Musculoskeletal: Negative for myalgias and arthralgias  Neurologic: Negative for confusion,dysarthria. Skin: Negative for itching,rash, good capillary refill. Psychiatric: Negative for depression,anxiety, agitation. Endocrine: Negative for polydipsia,polyuria,heat /cold intolerance. Past Medical History:   has a past medical history of Asthma, Crohn's disease of large intestine with other complication ([facility] IBD (inflammatory bowel disease), and Small bowel obstruction ([facility]      Past Surgical History:   has a past surgical history digits, neck supple  Neurology: Cranial nerves grossly intact. Alert and oriented in time, place and person. No speech or motor deficits  Psychiatry: Appropriate affect. Not agitated  Skin: Warm, dry, normal turgor, no rash    Labs:  CBC:   Lab Results   Component Value Date/Time    WBC 8.5 07/26/2022 11:13 AM    RBC 4.53 07/26/2022 11:13 AM    HGB 13.6 07/26/2022 11:13 AM    HCT 42.3 07/26/2022 11:13 AM    MCV 93.4 07/26/2022 11:13 AM    MCH 30.1 07/26/2022 11:13 AM    MCHC 32.2 07/26/2022 11:13 AM    RDW 21.9 07/26/2022 11:13 AM     07/26/2022 11:13 AM    MPV 7.8 07/26/2022 11:13 AM     BMP:    Lab Results   Component Value Date/Time     07/26/2022 11:13 AM    K 3.8 07/26/2022 11:13 AM     07/26/2022 11:13 AM    CO2 21 07/26/2022 11:13 AM    BUN 26 07/26/2022 11:13 AM    CREATININE 0.8 07/26/2022 11:13 AM    CALCIUM 9.2 07/26/2022 11:13 AM    GFRAA >60 07/26/2022 11:13 AM    LABGLOM >60 07/26/2022 11:13 AM    GLUCOSE 111 07/26/2022 11:13 AM       Chest Xray:   EKG:    I visualized CXR images and EKG strips     Discussed  with     Problem List  Principal Problem:    SBO (small bowel obstruction) ([address]  Resolved Problems:    * No resolved hospital problems. *        Assessment/Plan:     Small bowel obstruction  --Likely multifactorial given Crohn's.  -General surgery signed the patient conservative management.  -Nonoperative currently declined NG tube decompression.  -Pain control IV p.o. pain medication      Severe Crohn's disease.   -GI has been consulted given dose of steroids in the ER we will defer further steroid dosing from Travis Morris MD    7/26/2022 1:56 PM

## 2022-07-26 NOTE — ED NOTES
This RN called to bedside. Pt requesting to leave AMA at this time stating \"I have a GI appointment tomorrow and if I cancel I won't be able to get in until September. \" Dr. Anthony Loera notified via perfect serve.      Luba Graham RN  07/26/22 3758

## 2022-07-26 NOTE — PROGRESS NOTES
Pt arrived to unit. Pt states she is leaving AMA and requesting pain med and steroid scripts. MD paged.

## 2022-07-26 NOTE — CONSULTS
[address] [place] [address]    Chief complaint-abdominal pain    HPI: 68-year-old female who was diagnosed with Crohn's disease about 3 years ago. She has not been on treatment for Crohn's disease since that time. She was admitted to [facility] about 3 weeks ago where she was treated nonoperatively for a small bowel obstruction. The SBO was treated with NG tube decompression and steroids. She returns with severe abdominal pain which developed overnight. The pain was reported to be severe (greater than 10 out of 10), sharp, centrally located and radiated outward. Nothing made the pain worse. It was better with fasting. Associated symptoms include nausea, vomiting and multiple diarrheal stools. Reported abdominal distention improved with the nausea vomiting and diarrhea. Other history is pertinent for about a 10 pound weight loss over the last 3 months. No history of fevers, chills or urinary symptoms. Current CAT scan shows findings consistent with a small bowel obstruction secondary to Crohn's disease. The patient has refused NG tube placement.     Past Medical History:   Diagnosis Date    Asthma     Crohn's disease of large intestine with other complication (HCC)     IBD (inflammatory bowel disease)     Small bowel obstruction ([facility] [address]        Past Surgical History:   Procedure Laterality Date    APPENDECTOMY  2017    COLONOSCOPY  08/15/2019    SKIN BIOPSY         Social History     Socioeconomic History    Marital status: Single     Spouse name: Not on file    Number of children: 1    Years of education: Not on file    Highest education level: Not on file   Occupational History    Occupation:       Comment: [employer]    Tobacco Use    Smoking status: Never    Smokeless tobacco: Never   Vaping Use    Vaping Use: Never used   Substance and Sexual Activity    Alcohol use: Yes     Comment: occassionally    Drug use: Never    Sexual activity: Yes     Partners: Male   Other Topics Concern Gastrointestinal:  Positive for abdominal distention, abdominal pain, diarrhea, nausea and vomiting. Endocrine: Negative for cold intolerance and heat intolerance. Genitourinary:  Negative for difficulty urinating and dyspareunia. Musculoskeletal:  Negative for arthralgias and back pain. Skin:  Negative for color change and pallor. Allergic/Immunologic: Negative for environmental allergies and food allergies. Neurological:  Negative for dizziness and facial asymmetry. Hematological:  Negative for adenopathy. Does not bruise/bleed easily. Psychiatric/Behavioral:  Negative for agitation and behavioral problems. Physical Exam  Constitutional:       Appearance: She is well-developed. HENT:      Head: Normocephalic and atraumatic. Right Ear: External ear normal.      Left Ear: External ear normal.   Eyes:      Conjunctiva/sclera: Conjunctivae normal.   Cardiovascular:      Rate and Rhythm: Normal rate and regular rhythm. Pulmonary:      Effort: Pulmonary effort is normal.      Breath sounds: Normal breath sounds. Abdominal:      General: There is distension. Palpations: Abdomen is soft. Tenderness: There is no abdominal tenderness. Musculoskeletal:         General: Normal range of motion. Cervical back: Normal range of motion and neck supple. Skin:     General: Skin is warm and dry. Neurological:      Mental Status: She is alert and oriented to person, place, and time. Psychiatric:         Behavior: Behavior normal.     White blood count-8.5  Lactate-2.3    Assessment:  57-year-old female diagnosed with Crohn's disease about 3 years ago. She has not been on treatment for Crohn's disease and required admission to [facility] about 3 weeks ago for a small bowel obstruction. The small bowel obstruction was treated nonoperatively with NG tube decompression and steroids.   She presented to the ER today with complaints of severe abdominal pain associated with abdominal distention, nausea, vomiting and diarrhea. The abdominal distention has proved as result of the nausea, vomiting and diarrhea. On physical examination, she is not ill-appearing. Her abdomen is mildly distended without tenderness. White blood count is normal.  Lactate is mildly elevated at 2.3. CAT scan of the abdomen pelvis shows marked distention of multiple small bowel loops and findings consistent with active Crohn's disease. The patient has refused an NG tube. She is likely tolerating the current degree of small distention secondary to chronicity of the problem. NG tube decompression is preferred although compromised bowel is felt to be unlikely.     Plan:  N.p.o.  Aggressive IV hydration  Await GI input  We will continue to follow    Jyothi Morris MD  7/26/2022

## 2022-07-26 NOTE — ED PROVIDER NOTES
[address] [facility]        Pt Name: Radha Betancourt  MRN: [id number]  [place] 1995  Date of evaluation: 7/26/2022  Provider: DEDE Severino  PCP: Ayaz Morales, APRN - CNP  Note Started: 10:54 AM EDT       TYLER. I have evaluated this patient. My supervising physician was available for consultation. CHIEF COMPLAINT       Chief Complaint   Patient presents with    Abdominal Pain     C/o abd pain with n/v/d, symptoms started last night. Hx Crohn's       HISTORY OF PRESENT ILLNESS   (Location, Timing/Onset, Context/Setting, Quality, Duration, Modifying Factors, Severity, Associated Signs and Symptoms)  Note limiting factors. Chief Complaint: Abd Pain     Radha Betancourt is a 32 y.o. female with past medical history of asthma, Crohn's disease and previous small bowel obstruction 3 weeks ago who presents to the ED with complaint of abdominal pain. Patient states she developed severe diffuse abdominal pain within the past 24 hours with associated nausea, vomiting and diarrhea. States abdomen feels \"hard and bloated\". States symptoms feel similar to when she was seen at [facility] 3 weeks ago and had a small bowel obstruction. States was treated nonoperatively. She states she has tried to remain n.p.o. throughout the night but states has not been helpful. Became concerned and came to the ED for further evaluation and treatment. States pain is sharp in nature rated 10/10. Denies fever, chills, chest pain, shortness of breath, vaginal bleeding, vaginal discharge or urinary symptoms. Patient states past surgical history of appendectomy. Is currently on steroids. Denies any other immunologic's. Is scheduled to see a new GI doctor tomorrow but unsure of the name. Nursing Notes were all reviewed and agreed with or any disagreements were addressed in the HPI.     REVIEW OF SYSTEMS    (2-9 systems for level 4, 10 or more for level 5)     Review of Systems   Constitutional:  Positive for appetite change. Negative for activity change, chills, diaphoresis, fatigue and fever. Respiratory: Negative. Negative for cough, chest tightness and shortness of breath. Cardiovascular: Negative. Negative for chest pain, palpitations and leg swelling. Gastrointestinal:  Positive for abdominal distention, abdominal pain, diarrhea, nausea and vomiting. Negative for anal bleeding, blood in stool, constipation and rectal pain. Genitourinary:  Negative for decreased urine volume, difficulty urinating, dysuria, flank pain, frequency, hematuria, urgency, vaginal bleeding, vaginal discharge and vaginal pain. Musculoskeletal:  Negative for arthralgias, back pain, myalgias, neck pain and neck stiffness. Skin:  Negative for color change, pallor, rash and wound. Neurological:  Negative for dizziness, light-headedness and headaches. Positives and Pertinent negatives as per HPI. Except as noted above in the ROS, all other systems were reviewed and negative. PAST MEDICAL HISTORY     Past Medical History:   Diagnosis Date    Asthma     Crohn's disease of large intestine with other complication ([facility] [address]     IBD (inflammatory bowel disease)     Small bowel obstruction ([facility] [address]          SURGICAL HISTORY     Past Surgical History:   Procedure Laterality Date    APPENDECTOMY  2017    COLONOSCOPY  08/15/2019    SKIN BIOPSY           CURRENTMEDICATIONS       Previous Medications    APPLE CIDER VINEGAR PO    Take by mouth daily    DICYCLOMINE (BENTYL) 10 MG CAPSULE    Take 1 capsule by mouth 4 times daily as needed (abd pain)    FLAXSEED, LINSEED, (FLAX SEED OIL PO)    Take by mouth    METHYLPREDNISOLONE (MEDROL DOSEPACK) 4 MG TABLET    Take by mouth.     OMEGA-3 FATTY ACIDS (FISH OIL) 1000 MG CAPS    Take 1,000 mg by mouth daily    ONDANSETRON (ZOFRAN) 4 MG TABLET    Take 1 tablet by mouth every 8 hours as needed for Nausea or Vomiting    PRENATAL VIT-FE FUMARATE-FA (PRENATAL PO)    Take by mouth daily         ALLERGIES     Patient has no known allergies. FAMILYHISTORY       Family History   Problem Relation Age of Onset    No Known Problems Mother     No Known Problems Father     Cancer Maternal Grandmother     Diabetes Maternal Grandfather     No Known Problems Paternal Grandmother     No Known Problems Paternal Grandfather     No Known Problems Half-Sister     No Known Problems Half-Brother     No Known Problems Half-Sister     No Known Problems Half-Brother     No Known Problems Half-Brother           SOCIAL HISTORY       Social History     Tobacco Use    Smoking status: Never    Smokeless tobacco: Never   Vaping Use    Vaping Use: Never used   Substance Use Topics    Alcohol use: Yes     Comment: occassionally    Drug use: Never       SCREENINGS    Elio Coma Scale  Eye Opening: Spontaneous  Best Verbal Response: Oriented  Best Motor Response: Obeys commands  Elio Coma Scale Score: 15        PHYSICAL EXAM    (up to 7 for level 4, 8 or more for level 5)     ED Triage Vitals [07/26/22 1046]   BP Temp Temp Source Heart Rate Resp SpO2 Height Weight   103/71 98.5 °F (36.9 °C) Oral (!) 126 18 99 % 5' 5\" (1.651 m) 125 lb (56.7 kg)       Physical Exam  Constitutional:       General: She is not in acute distress. Appearance: She is well-developed. She is not ill-appearing, toxic-appearing or diaphoretic. HENT:      Head: Normocephalic and atraumatic. Right Ear: External ear normal.      Left Ear: External ear normal.   Eyes:      General:         Right eye: No discharge. Left eye: No discharge. Conjunctiva/sclera: Conjunctivae normal.   Cardiovascular:      Rate and Rhythm: Regular rhythm. Tachycardia present. Pulses: Normal pulses. Heart sounds: Normal heart sounds. No murmur heard. No friction rub. No gallop. Comments: 2+ radial pulses bilaterally. No pedal edema. No calf tenderness. No JVD.   Pulmonary: Effort: Pulmonary effort is normal. No respiratory distress. Breath sounds: Normal breath sounds. No stridor. No wheezing, rhonchi or rales. Chest:      Chest wall: No tenderness. Abdominal:      General: Abdomen is flat. Bowel sounds are normal. There is no distension. Palpations: Abdomen is soft. There is no mass. Tenderness: There is generalized abdominal tenderness. There is no right CVA tenderness, left CVA tenderness, guarding or rebound. Negative signs include Dyer's sign and McBurney's sign. Hernia: No hernia is present. Musculoskeletal:         General: Normal range of motion. Cervical back: Normal range of motion and neck supple. Skin:     General: Skin is warm and dry. Coloration: Skin is not pale. Findings: No erythema or rash. Neurological:      Mental Status: She is alert and oriented to person, place, and time.    Psychiatric:         Behavior: Behavior normal.       DIAGNOSTIC RESULTS   LABS:    Labs Reviewed   CBC WITH AUTO DIFFERENTIAL - Abnormal; Notable for the following components:       Result Value    RDW 21.9 (*)     All other components within normal limits   COMPREHENSIVE METABOLIC PANEL W/ REFLEX TO MG FOR LOW K - Abnormal; Notable for the following components:    Glucose 111 (*)     BUN 26 (*)     ALT 62 (*)     All other components within normal limits   LACTIC ACID - Abnormal; Notable for the following components:    Lactic Acid 2.3 (*)     All other components within normal limits   SEDIMENTATION RATE - Abnormal; Notable for the following components:    Sed Rate >130 (*)     All other components within normal limits   C-REACTIVE PROTEIN - Abnormal; Notable for the following components:    CRP 34.9 (*)     All other components within normal limits   URINALYSIS WITH REFLEX TO CULTURE - Abnormal; Notable for the following components:    Bilirubin Urine MODERATE (*)     Protein, UA 30 (*)     Nitrite, Urine POSITIVE (*)     All other components within normal limits   MICROSCOPIC URINALYSIS - Abnormal; Notable for the following components:    Hyaline Casts, UA 3-5 (*)     Mucus, UA 1+ (*)     Bacteria, UA 1+ (*)     All other components within normal limits   HCG, SERUM, QUALITATIVE   LIPASE       When ordered only abnormal lab results are displayed. All other labs were within normal range or not returned as of this dictation. EKG: When ordered, EKG's are interpreted by the Emergency Department Physician in the absence of a cardiologist.  Please see their note for interpretation of EKG. RADIOLOGY:   Non-plain film images such as CT, Ultrasound and MRI are read by the radiologist. Plain radiographic images are visualized and preliminarily interpreted by the ED Provider with the below findings:        Interpretation per the Radiologist below, if available at the time of this note:    CT ABDOMEN PELVIS W IV CONTRAST Additional Contrast? None   Final Result   Active Crohn's disease of the terminal ileum with complete small bowel   obstruction. No results found. PROCEDURES   Unless otherwise noted below, none     Procedures    CRITICAL CARE TIME   There was a high probability of life-threatening clinical deterioration in the patient's condition requiring my urgent intervention. I personally saw the patient and independently provided 33 minutes of non-concurrent critical care out of the total shared critical care time provided, excluding separately reportable procedures.         CONSULTS:  IP CONSULT TO GI  IP CONSULT TO GENERAL SURGERY      EMERGENCY DEPARTMENT COURSE and DIFFERENTIAL DIAGNOSIS/MDM:   Vitals:    Vitals:    07/26/22 1046 07/26/22 1130 07/26/22 1140 07/26/22 1315   BP: 103/71 107/89 105/86 101/72   Pulse: (!) 126      Resp: 18      Temp: 98.5 °F (36.9 °C)      TempSrc: Oral      SpO2: 99% 100% 98% 100%   Weight: 125 lb (56.7 kg)      Height: 5' 5\" (1.651 m)          Patient was given the following medications:  Medications ondansetron (ZOFRAN) injection 4 mg (4 mg IntraVENous Given 7/26/22 1129)   0.9 % sodium chloride bolus (0 mLs IntraVENous Stopped 7/26/22 1236)   fentaNYL (SUBLIMAZE) injection 50 mcg (50 mcg IntraVENous Given 7/26/22 1129)   iopamidol (ISOVUE-370) 76 % injection 50 mL (50 mLs IntraVENous Given 7/26/22 1210)   methylPREDNISolone sodium (SOLU-MEDROL) injection 125 mg (125 mg IntraVENous Given 7/26/22 1259)   fentaNYL (SUBLIMAZE) injection 50 mcg (50 mcg IntraVENous Given 7/26/22 1325)         Is this patient to be included in the SEP-1 Core Measure due to severe sepsis or septic shock? No   Exclusion criteria - the patient is NOT to be included for SEP-1 Core Measure due to: Infection is not suspected    Patient is a 77-year-old female who presents to the ED with complaint of abdominal pain and distention with concern for small bowel obstruction. Has history of Crohn's disease and is on steroids at home. Similar presentation a couple weeks ago where she was admitted for small bowel obstruction. IV was established and blood work obtained. She is tachycardic which could be due to some pain versus nausea/vomiting. She was given fluids, pain medication nausea medication here in the ED. Pregnancy was negative. CBC showed no white count and hemoglobin and platelets. CMP relatively unremarkable. Lipase was normal.  Lactic acid 2.3. Sed rate over 130. CRP 34.9. Urinalysis showed 1+ bacteria with no significant signs of infection. CT of the abdomen pelvis showed active Crohn's disease of the terminal ileum with complete small bowel obstruction. Most likely suffering from terminal ileitis due to Crohn's disease with small bowel obstruction. Case discussed with general surgery and GI. Ordered steroids here in the emergency department. Offered NG tube with patient declined. Case discussed with hospitalist service for admission. FINAL IMPRESSION      1.  Terminal ileitis, with intestinal obstruction [facility])          DISPOSITION/PLAN   DISPOSITION Admitted 07/26/2022 01:30:42 PM      PATIENT REFERRED TO:  No follow-up provider specified.     DISCHARGE MEDICATIONS:  New Prescriptions    No medications on file       DISCONTINUED MEDICATIONS:  Discontinued Medications    No medications on file              (Please note that portions of this note were completed with a voice recognition program.  Efforts were made to edit the dictations but occasionally words are mis-transcribed.)    DEDE Grant (electronically signed)           DEDE Rehman  07/26/22 8011

## 2022-07-26 NOTE — CONSULTS
Gastroenterology Consult Note        Patient: Yosvany Villegas  : 1995  Acct#:      Date:  2022    Subjective:       History of Present Illness  Patient is a 32 y.o.  female admitted with SBO who is seen in consult for Crohn's. Patient was seen inpatient in 2019 for SBO. She was treated conservatively. She followed up with Dr. Neda Hargrove for colonoscopy in  that showed friable nodular cecum, ulcerated and stenotic ileocecal valve and was not able to intubate the TI, ulcer in the ascending colon and rectal erythema. Path with acute colitis with chronic changes suggestive of IBD. She did not follow-up until 2021 when she was seen in the office with Dr. Mendy Cisneros. Plan was for colonoscopy then found out we did not take her insurance. She did not establish with another GI. She then had abdominal pain and distention in  of this year and was admitted to [facility] for SBO. She was treated conservatively with NG tube decompression and steroids. She was DC home on a short tapered course of steroids. She noticed as the steroids were tapered she started having recurrent GI symptoms. Had increasing abdominal distention. 2 days ago began having sharp, severe pain across the abdomen. Has been having nonbloody diarrhea multiple times a day. Last night she had nausea and vomiting. Came to the ER today and diagnosed with SBO. Currently feels better with pain meds. Declined NG tube. Patient states she has an appointment with GI at [facility] tomorrow.     Past Medical History:   Diagnosis Date    Asthma     Crohn's disease of large intestine with other complication ([facility] [address]     IBD (inflammatory bowel disease)     Small bowel obstruction ([facility] [address]       Past Surgical History:   Procedure Laterality Date    APPENDECTOMY  2017    COLONOSCOPY  08/15/2019    SKIN BIOPSY        Past Endoscopic History: see hpi    Admission Meds  No current facility-administered medications on file prior to encounter. Current Outpatient Medications on File Prior to Encounter   Medication Sig Dispense Refill    ondansetron (ZOFRAN) 4 MG tablet Take 1 tablet by mouth every 8 hours as needed for Nausea or Vomiting 12 tablet 0    dicyclomine (BENTYL) 10 MG capsule Take 1 capsule by mouth 4 times daily as needed (abd pain) 20 capsule 0    methylPREDNISolone (MEDROL DOSEPACK) 4 MG tablet Take by mouth.  1 kit 0    Omega-3 Fatty Acids (FISH OIL) 1000 MG CAPS Take 1,000 mg by mouth daily      Flaxseed, Linseed, (FLAX SEED OIL PO) Take by mouth      Prenatal Vit-Fe Fumarate-FA (PRENATAL PO) Take by mouth daily      APPLE CIDER VINEGAR PO Take by mouth daily              Allergies  No Known Allergies   Social   Social History     Tobacco Use    Smoking status: Never    Smokeless tobacco: Never   Substance Use Topics    Alcohol use: Yes     Comment: occassionally        Family History   Problem Relation Age of Onset    No Known Problems Mother     No Known Problems Father     Cancer Maternal Grandmother     Diabetes Maternal Grandfather     No Known Problems Paternal Grandmother     No Known Problems Paternal Grandfather     No Known Problems Half-Sister     No Known Problems Half-Brother     No Known Problems Half-Sister     No Known Problems Half-Brother     No Known Problems Half-Brother         Review of Systems  Constitutional: negative for fevers, chills, sweats    Ears, nose, mouth, throat, and face: negative for nasal congestion and sore throat   Respiratory: negative for cough and shortness of breath   Cardiovascular: negative for chest pain and dyspnea   Gastrointestinal: see hpi   Genitourinary:negative for dysuria and frequency   Integument/breast: negative for pruritus and rash   Hematologic/lymphatic: negative for bleeding and easy bruising   Musculoskeletal:negative for arthralgias and myalgias   Neurological: negative for dizziness and weakness       Physical Exam  Blood pressure 105/86, pulse (!) 126, temperature 98.5 °F (36.9 °C), temperature source Oral, resp. rate 18, height 5' 5\" (1.651 m), weight 125 lb (56.7 kg), last menstrual period 07/06/2022, SpO2 98 %. General appearance: alert, cooperative, no distress, appears stated age  Eyes: Anicteric  Head: Normocephalic, without obvious abnormality  Lungs: clear to auscultation bilaterally, Normal Effort  Heart: regular rate and rhythm, normal S1 and S2, no murmurs or rubs  Abdomen: mild distention, mild tenderness in RLQ. Bowel sounds present. No masses,  no organomegaly. Extremities: atraumatic, no cyanosis or edema  Skin: warm and dry, no jaundice  Neuro: Grossly intact, A&OX3  Musculoskeletal: 5/5  strength BUE      Data Review:    Recent Labs     07/26/22  1113   WBC 8.5   HGB 13.6   HCT 42.3   MCV 93.4        Recent Labs     07/26/22  1113      K 3.8      CO2 21   BUN 26*   CREATININE 0.8     Recent Labs     07/26/22  1113   AST 17   ALT 62*   BILITOT 0.7   ALKPHOS 98     Recent Labs     07/26/22  1113   LIPASE 16.0     No results for input(s): PROTIME, INR in the last 72 hours. No results for input(s): PTT in the last 72 hours. No results for input(s): OCCULTBLD in the last 72 hours. Imaging Studies:               CT-scan of abdomen and pelvis w iv contrast 7/26/22  FINDINGS:   Lower Chest: There is no consolidation or effusion. Organs: The liver, pancreas, spleen, kidneys and adrenals are unremarkable. GI/Bowel: There is moderate to severe fluid-filled distention of small bowel   throughout the abdomen and pelvis with transition to decompressed bowel in   the region of the ileocecal valve. The largest bowel loop measures 5 cm in   diameter. The colon is completely decompressed. The appendix is not   visualized. Pelvis: The pelvic organs are grossly unremarkable. The bladder is   decompressed and thus not evaluated.      Peritoneum/Retroperitoneum: There is no free air, free fluid or   intraperitoneal inflammatory change. There is no free air, free fluid or   adenopathy. Mild inflammatory changes surround the base of the cecum and   terminal ileum at the site of obstruction. Bones/Soft Tissues: There is no acute fracture or aggressive osseous   Impression:     Active Crohn's disease of the terminal ileum with complete small bowel   obstruction. Assessment:     Active Problems:    * No active hospital problems. *  Resolved Problems:    * No resolved hospital problems. *    Crohn's disease with bowel obstruction -history of Crohn's disease involving the TI and right colon. Not established with a GI currently. Recently finished tapered course of steroids. CT with mild inflammatory changes around the cecum and TI with obstruction. She is declining NG tube decompression. Recommendations:   -NPO  -IV fluids  -Check stool for C.diff, GI bacterial pathogens PCR, and EIA for parasites   -Solu-Medrol 20 mg IV 3 times daily  -Recommended NG tube insertion for decompression but patient declined. -Will need outpatient follow-up for initiation of biologic medication for her Crohn's disease  -Surgery following for SBO    Discussed with Dr. Nyasia Garcia, [facility]  [address] [place] [address]  The above PA-C personally performed a face to face diagnostic evaluation on this patient. I was unable to interviewed and examined the patient, but agree with the findings and recommended plan of care. In summary, my findings and plan are the following: As above, young woman with Crohn's disease of terminal ileum/cecum in past admitted with SBO. Unfortunately, she signed out AMA prior to my visit. She apparently has appt with GI doc at [facility] tomorrow - perhaps her motivation for leaving. She may f/u with us as needed. Rec's remain as above.     [address]  7/26/2022

## 2022-07-30 NOTE — DISCHARGE SUMMARY
[facility] DISCHARGE SUMMARY    Patient Demographics    Patient. Abhijit Marquez  Date of Birth. 1995  MRN. [id number]     Primary care provider. FABIO Cole CNP  (Tel: [phone])    Admit date: 7/26/2022    Discharge date (blank if same as Note Date): 7/26/2022  Note Date: 7/30/2022     Reason for Hospitalization. Chief Complaint   Patient presents with    Abdominal Pain     C/o abd pain with n/v/d, symptoms started last night. Hx [facility] Course. Small bowel obstruction  -Likely secondary to Crohn's. Patient was offered NG tube decompression but cecal refused eval by general surgery and GI. Patient started some IV steroids due to history of Crohn's. Patient left AGAINST MEDICAL ADVICE prior to treating completing    Consults. IP CONSULT TO GI  IP CONSULT TO GENERAL SURGERY    Physical examination on discharge day. /72   Pulse (!) 126   Temp 98.5 °F (36.9 °C) (Oral)   Resp 18   Ht 5' 5\" (1.651 m)   Wt 125 lb (56.7 kg)   LMP 07/06/2022 (Approximate)   SpO2 100%   BMI 20.80 kg/m²   General appearance. Alert. Looks comfortable. HEENT. Sclera clear. Moist mucus membranes. Cardiovascular. Regular rate and rhythm, normal S1, S2. No murmur. Respiratory. Not using accessory muscles. Clear to auscultation bilaterally, no wheeze. Gastrointestinal. Abdomen soft, non-tender, not distended, normal bowel sounds  Neurology. Facial symmetry. No speech deficits. Moving all extremities equally. Extremities. No edema in lower extremities. Skin. Warm, dry, normal turgor    Condition at time of discharge stable     Medication instructions provided to patient at discharge.      Medication List        ASK your doctor about these medications      APPLE CIDER VINEGAR PO     dicyclomine 10 MG capsule  Commonly known as: Bentyl  Take 1 capsule by mouth 4 times daily as needed (abd pain)     fish oil 1000 MG Caps     FLAX SEED OIL PO     methylPREDNISolone 4 MG tablet  Commonly known as: MEDROL DOSEPACK  Take by mouth. ondansetron 4 MG tablet  Commonly known as: Zofran  Take 1 tablet by mouth every 8 hours as needed for Nausea or Vomiting     PRENATAL PO              Discharge recommendations given to patient. Follow Up. pcp in 1 week   Disposition. home  Activity. activity as tolerated  Diet: No diet orders on file      Spent 45  minutes in discharge process.     Signed:  Parris Linares MD     7/30/2022 4:47 PM

## 2022-09-06 ENCOUNTER — APPOINTMENT (OUTPATIENT)
Dept: CT IMAGING | Age: 27
DRG: 245 | End: 2022-09-06
Payer: MEDICARE

## 2022-09-06 ENCOUNTER — HOSPITAL ENCOUNTER (INPATIENT)
Age: 27
LOS: 1 days | Discharge: LEFT BEFORE DISCHARGE | DRG: 245 | End: 2022-09-07
Attending: FAMILY MEDICINE | Admitting: FAMILY MEDICINE
Payer: MEDICARE

## 2022-09-06 DIAGNOSIS — E87.6 HYPOKALEMIA: ICD-10-CM

## 2022-09-06 DIAGNOSIS — K56.609 SBO (SMALL BOWEL OBSTRUCTION) (HCC): Primary | ICD-10-CM

## 2022-09-06 DIAGNOSIS — K50.912 EXACERBATION OF CROHN'S DISEASE WITH INTESTINAL OBSTRUCTION (HCC): ICD-10-CM

## 2022-09-06 LAB
A/G RATIO: 1.3 (ref 1.1–2.2)
ALBUMIN SERPL-MCNC: 4.5 G/DL (ref 3.4–5)
ALP BLD-CCNC: 100 U/L (ref 40–129)
ALT SERPL-CCNC: 44 U/L (ref 10–40)
ANION GAP SERPL CALCULATED.3IONS-SCNC: 18 MMOL/L (ref 3–16)
AST SERPL-CCNC: 16 U/L (ref 15–37)
BACTERIA: ABNORMAL /HPF
BASOPHILS ABSOLUTE: 0 K/UL (ref 0–0.2)
BASOPHILS RELATIVE PERCENT: 0.4 %
BILIRUB SERPL-MCNC: 0.5 MG/DL (ref 0–1)
BILIRUBIN URINE: ABNORMAL
BLOOD, URINE: NEGATIVE
BUN BLDV-MCNC: 34 MG/DL (ref 7–20)
CALCIUM SERPL-MCNC: 9.1 MG/DL (ref 8.3–10.6)
CHLORIDE BLD-SCNC: 102 MMOL/L (ref 99–110)
CLARITY: ABNORMAL
CO2: 20 MMOL/L (ref 21–32)
COLOR: ABNORMAL
CREAT SERPL-MCNC: 0.8 MG/DL (ref 0.6–1.1)
EOSINOPHILS ABSOLUTE: 0 K/UL (ref 0–0.6)
EOSINOPHILS RELATIVE PERCENT: 0.1 %
EPITHELIAL CELLS, UA: ABNORMAL /HPF (ref 0–5)
GFR AFRICAN AMERICAN: >60
GFR NON-AFRICAN AMERICAN: >60
GLUCOSE BLD-MCNC: 142 MG/DL (ref 70–99)
GLUCOSE URINE: NEGATIVE MG/DL
HCG QUALITATIVE: NEGATIVE
HCT VFR BLD CALC: 45.4 % (ref 36–48)
HEMOGLOBIN: 15 G/DL (ref 12–16)
KETONES, URINE: ABNORMAL MG/DL
LACTIC ACID: 2 MMOL/L (ref 0.4–2)
LEUKOCYTE ESTERASE, URINE: NEGATIVE
LIPASE: 12 U/L (ref 13–60)
LYMPHOCYTES ABSOLUTE: 0.8 K/UL (ref 1–5.1)
LYMPHOCYTES RELATIVE PERCENT: 7.5 %
MAGNESIUM: 1.9 MG/DL (ref 1.8–2.4)
MCH RBC QN AUTO: 31.3 PG (ref 26–34)
MCHC RBC AUTO-ENTMCNC: 32.9 G/DL (ref 31–36)
MCV RBC AUTO: 95.1 FL (ref 80–100)
MICROSCOPIC EXAMINATION: YES
MONOCYTES ABSOLUTE: 1 K/UL (ref 0–1.3)
MONOCYTES RELATIVE PERCENT: 9.5 %
NEUTROPHILS ABSOLUTE: 8.7 K/UL (ref 1.7–7.7)
NEUTROPHILS RELATIVE PERCENT: 82.5 %
NITRITE, URINE: NEGATIVE
PDW BLD-RTO: 15.6 % (ref 12.4–15.4)
PH UA: 5 (ref 5–8)
PLATELET # BLD: 349 K/UL (ref 135–450)
PMV BLD AUTO: 8.4 FL (ref 5–10.5)
POTASSIUM REFLEX MAGNESIUM: 2.9 MMOL/L (ref 3.5–5.1)
POTASSIUM SERPL-SCNC: 3.8 MMOL/L (ref 3.5–5.1)
PROTEIN UA: 100 MG/DL
RBC # BLD: 4.78 M/UL (ref 4–5.2)
RBC UA: ABNORMAL /HPF (ref 0–4)
SODIUM BLD-SCNC: 140 MMOL/L (ref 136–145)
SPECIFIC GRAVITY UA: >=1.03 (ref 1–1.03)
TOTAL PROTEIN: 8.1 G/DL (ref 6.4–8.2)
URINE REFLEX TO CULTURE: YES
URINE TYPE: ABNORMAL
UROBILINOGEN, URINE: 1 E.U./DL
WBC # BLD: 10.6 K/UL (ref 4–11)
WBC UA: ABNORMAL /HPF (ref 0–5)

## 2022-09-06 PROCEDURE — 6360000004 HC RX CONTRAST MEDICATION: Performed by: PHYSICIAN ASSISTANT

## 2022-09-06 PROCEDURE — 99285 EMERGENCY DEPT VISIT HI MDM: CPT

## 2022-09-06 PROCEDURE — 2580000003 HC RX 258: Performed by: FAMILY MEDICINE

## 2022-09-06 PROCEDURE — 6360000002 HC RX W HCPCS: Performed by: PHYSICIAN ASSISTANT

## 2022-09-06 PROCEDURE — 81001 URINALYSIS AUTO W/SCOPE: CPT

## 2022-09-06 PROCEDURE — 84132 ASSAY OF SERUM POTASSIUM: CPT

## 2022-09-06 PROCEDURE — 84703 CHORIONIC GONADOTROPIN ASSAY: CPT

## 2022-09-06 PROCEDURE — 6360000002 HC RX W HCPCS: Performed by: FAMILY MEDICINE

## 2022-09-06 PROCEDURE — 2580000003 HC RX 258: Performed by: PHYSICIAN ASSISTANT

## 2022-09-06 PROCEDURE — 1200000000 HC SEMI PRIVATE

## 2022-09-06 PROCEDURE — 80053 COMPREHEN METABOLIC PANEL: CPT

## 2022-09-06 PROCEDURE — 83605 ASSAY OF LACTIC ACID: CPT

## 2022-09-06 PROCEDURE — 87086 URINE CULTURE/COLONY COUNT: CPT

## 2022-09-06 PROCEDURE — 83735 ASSAY OF MAGNESIUM: CPT

## 2022-09-06 PROCEDURE — 85025 COMPLETE CBC W/AUTO DIFF WBC: CPT

## 2022-09-06 PROCEDURE — 36415 COLL VENOUS BLD VENIPUNCTURE: CPT

## 2022-09-06 PROCEDURE — 74177 CT ABD & PELVIS W/CONTRAST: CPT

## 2022-09-06 PROCEDURE — 83690 ASSAY OF LIPASE: CPT

## 2022-09-06 RX ORDER — ONDANSETRON 2 MG/ML
4 INJECTION INTRAMUSCULAR; INTRAVENOUS ONCE
Status: COMPLETED | OUTPATIENT
Start: 2022-09-06 | End: 2022-09-06

## 2022-09-06 RX ORDER — ONDANSETRON 2 MG/ML
4 INJECTION INTRAMUSCULAR; INTRAVENOUS EVERY 6 HOURS PRN
Status: DISCONTINUED | OUTPATIENT
Start: 2022-09-06 | End: 2022-09-07 | Stop reason: HOSPADM

## 2022-09-06 RX ORDER — ACETAMINOPHEN 650 MG/1
650 SUPPOSITORY RECTAL EVERY 6 HOURS PRN
Status: DISCONTINUED | OUTPATIENT
Start: 2022-09-06 | End: 2022-09-07 | Stop reason: HOSPADM

## 2022-09-06 RX ORDER — SODIUM CHLORIDE 9 MG/ML
INJECTION, SOLUTION INTRAVENOUS CONTINUOUS
Status: DISCONTINUED | OUTPATIENT
Start: 2022-09-06 | End: 2022-09-07 | Stop reason: HOSPADM

## 2022-09-06 RX ORDER — SODIUM CHLORIDE 0.9 % (FLUSH) 0.9 %
5-40 SYRINGE (ML) INJECTION EVERY 12 HOURS SCHEDULED
Status: DISCONTINUED | OUTPATIENT
Start: 2022-09-06 | End: 2022-09-07 | Stop reason: HOSPADM

## 2022-09-06 RX ORDER — POLYETHYLENE GLYCOL 3350 17 G/17G
17 POWDER, FOR SOLUTION ORAL DAILY PRN
Status: DISCONTINUED | OUTPATIENT
Start: 2022-09-06 | End: 2022-09-07 | Stop reason: HOSPADM

## 2022-09-06 RX ORDER — ONDANSETRON 4 MG/1
4 TABLET, ORALLY DISINTEGRATING ORAL EVERY 8 HOURS PRN
Status: DISCONTINUED | OUTPATIENT
Start: 2022-09-06 | End: 2022-09-07 | Stop reason: HOSPADM

## 2022-09-06 RX ORDER — POTASSIUM CHLORIDE 7.45 MG/ML
10 INJECTION INTRAVENOUS ONCE
Status: DISCONTINUED | OUTPATIENT
Start: 2022-09-06 | End: 2022-09-06 | Stop reason: SDUPTHER

## 2022-09-06 RX ORDER — POTASSIUM CHLORIDE 20 MEQ/1
40 TABLET, EXTENDED RELEASE ORAL PRN
Status: DISCONTINUED | OUTPATIENT
Start: 2022-09-06 | End: 2022-09-07 | Stop reason: HOSPADM

## 2022-09-06 RX ORDER — SODIUM CHLORIDE, SODIUM LACTATE, POTASSIUM CHLORIDE, AND CALCIUM CHLORIDE .6; .31; .03; .02 G/100ML; G/100ML; G/100ML; G/100ML
1000 INJECTION, SOLUTION INTRAVENOUS ONCE
Status: COMPLETED | OUTPATIENT
Start: 2022-09-06 | End: 2022-09-06

## 2022-09-06 RX ORDER — ACETAMINOPHEN 325 MG/1
650 TABLET ORAL EVERY 6 HOURS PRN
Status: DISCONTINUED | OUTPATIENT
Start: 2022-09-06 | End: 2022-09-07 | Stop reason: HOSPADM

## 2022-09-06 RX ORDER — POTASSIUM CHLORIDE 7.45 MG/ML
10 INJECTION INTRAVENOUS
Status: DISPENSED | OUTPATIENT
Start: 2022-09-06 | End: 2022-09-06

## 2022-09-06 RX ORDER — POTASSIUM CHLORIDE 20 MEQ/1
60 TABLET, EXTENDED RELEASE ORAL ONCE
Status: DISCONTINUED | OUTPATIENT
Start: 2022-09-06 | End: 2022-09-06

## 2022-09-06 RX ORDER — METHYLPREDNISOLONE SODIUM SUCCINATE 40 MG/ML
40 INJECTION, POWDER, LYOPHILIZED, FOR SOLUTION INTRAMUSCULAR; INTRAVENOUS DAILY
Status: DISCONTINUED | OUTPATIENT
Start: 2022-09-06 | End: 2022-09-07

## 2022-09-06 RX ORDER — SODIUM CHLORIDE 9 MG/ML
INJECTION, SOLUTION INTRAVENOUS PRN
Status: DISCONTINUED | OUTPATIENT
Start: 2022-09-06 | End: 2022-09-07 | Stop reason: HOSPADM

## 2022-09-06 RX ORDER — MORPHINE SULFATE 2 MG/ML
2 INJECTION, SOLUTION INTRAMUSCULAR; INTRAVENOUS EVERY 4 HOURS PRN
Status: DISCONTINUED | OUTPATIENT
Start: 2022-09-06 | End: 2022-09-07 | Stop reason: HOSPADM

## 2022-09-06 RX ORDER — MORPHINE SULFATE 4 MG/ML
4 INJECTION, SOLUTION INTRAMUSCULAR; INTRAVENOUS
Status: COMPLETED | OUTPATIENT
Start: 2022-09-06 | End: 2022-09-06

## 2022-09-06 RX ORDER — ENOXAPARIN SODIUM 100 MG/ML
40 INJECTION SUBCUTANEOUS DAILY
Status: DISCONTINUED | OUTPATIENT
Start: 2022-09-07 | End: 2022-09-07 | Stop reason: HOSPADM

## 2022-09-06 RX ORDER — SODIUM CHLORIDE 0.9 % (FLUSH) 0.9 %
5-40 SYRINGE (ML) INJECTION PRN
Status: DISCONTINUED | OUTPATIENT
Start: 2022-09-06 | End: 2022-09-07 | Stop reason: HOSPADM

## 2022-09-06 RX ORDER — POTASSIUM CHLORIDE 7.45 MG/ML
10 INJECTION INTRAVENOUS PRN
Status: DISCONTINUED | OUTPATIENT
Start: 2022-09-06 | End: 2022-09-07 | Stop reason: HOSPADM

## 2022-09-06 RX ADMIN — SODIUM CHLORIDE, PRESERVATIVE FREE 10 ML: 5 INJECTION INTRAVENOUS at 22:20

## 2022-09-06 RX ADMIN — SODIUM CHLORIDE, POTASSIUM CHLORIDE, SODIUM LACTATE AND CALCIUM CHLORIDE 1000 ML: 600; 310; 30; 20 INJECTION, SOLUTION INTRAVENOUS at 20:31

## 2022-09-06 RX ADMIN — ONDANSETRON 4 MG: 2 INJECTION INTRAMUSCULAR; INTRAVENOUS at 20:28

## 2022-09-06 RX ADMIN — SODIUM CHLORIDE: 9 INJECTION, SOLUTION INTRAVENOUS at 20:33

## 2022-09-06 RX ADMIN — MORPHINE SULFATE 4 MG: 4 INJECTION, SOLUTION INTRAMUSCULAR; INTRAVENOUS at 20:29

## 2022-09-06 RX ADMIN — POTASSIUM CHLORIDE 10 MEQ: 7.46 INJECTION, SOLUTION INTRAVENOUS at 20:32

## 2022-09-06 RX ADMIN — METHYLPREDNISOLONE SODIUM SUCCINATE 40 MG: 40 INJECTION, POWDER, FOR SOLUTION INTRAMUSCULAR; INTRAVENOUS at 22:46

## 2022-09-06 RX ADMIN — MORPHINE SULFATE 2 MG: 2 INJECTION, SOLUTION INTRAMUSCULAR; INTRAVENOUS at 22:46

## 2022-09-06 RX ADMIN — POTASSIUM CHLORIDE 10 MEQ: 7.46 INJECTION, SOLUTION INTRAVENOUS at 22:17

## 2022-09-06 RX ADMIN — IOPAMIDOL 50 ML: 755 INJECTION, SOLUTION INTRAVENOUS at 16:13

## 2022-09-06 ASSESSMENT — PAIN - FUNCTIONAL ASSESSMENT: PAIN_FUNCTIONAL_ASSESSMENT: 0-10

## 2022-09-06 ASSESSMENT — PAIN DESCRIPTION - PAIN TYPE
TYPE: ACUTE PAIN
TYPE: ACUTE PAIN

## 2022-09-06 ASSESSMENT — PAIN DESCRIPTION - ORIENTATION
ORIENTATION: LEFT;MID
ORIENTATION: LEFT
ORIENTATION: LEFT;MID

## 2022-09-06 ASSESSMENT — PAIN DESCRIPTION - LOCATION
LOCATION: ABDOMEN

## 2022-09-06 ASSESSMENT — PAIN SCALES - GENERAL
PAINLEVEL_OUTOF10: 10
PAINLEVEL_OUTOF10: 10
PAINLEVEL_OUTOF10: 6
PAINLEVEL_OUTOF10: 10
PAINLEVEL_OUTOF10: 10

## 2022-09-06 ASSESSMENT — PAIN DESCRIPTION - DESCRIPTORS
DESCRIPTORS: ACHING;SHARP;STABBING
DESCRIPTORS: TIGHTNESS
DESCRIPTORS: ACHING
DESCRIPTORS: ACHING;STABBING;TENDER

## 2022-09-06 ASSESSMENT — PAIN DESCRIPTION - FREQUENCY: FREQUENCY: CONTINUOUS

## 2022-09-06 NOTE — ED PROVIDER NOTES
[facility]        Pt Name: Maci Calero  MRN: [id number]  [place] 1995  Date of evaluation: 9/6/2022  Provider: Parth Mayfield PA-C  PCP: Nolon Mcburney, APRN - CNP  Note Started: 2:54 PM EDT       TYLER. I have evaluated this patient. My supervising physician was available for consultation. CHIEF COMPLAINT       Chief Complaint   Patient presents with    Abdominal Pain     Arrived per self d/t abd pain that started yesterday; hx of Chrones disease       HISTORY OF PRESENT ILLNESS   (Location, Timing/Onset, Context/Setting, Quality, Duration, Modifying Factors, Severity, Associated Signs and Symptoms)  Note limiting factors. Chief Complaint: Abdominal pain    Maci Calero is a 32 y.o. female with past medical history of Crohn's who presents complaining of abdominal pain x1 day. Patient reports generalized abdominal pain, cramping and burning, severe, nonradiating, no alleviating or aggravating factors x1 day. She reports she was taking 60 mg of prednisone daily, states her medication was not refilled, has not been taking steroids for 1 week. She also complains of vomiting and diarrhea. Denies fever, dysuria, hematemesis, melena, hematochezia, syncope, chance of pregnancy. Nursing Notes were all reviewed and agreed with or any disagreements were addressed in the HPI. REVIEW OF SYSTEMS    (2-9 systems for level 4, 10 or more for level 5)     Review of Systems   All other systems reviewed and are negative. Positives and Pertinent negatives as per HPI. Except as noted above in the ROS, all other systems were reviewed and negative.        PAST MEDICAL HISTORY     Past Medical History:   Diagnosis Date    Asthma     Crohn's disease of large intestine with other complication ([facility] [address]     IBD (inflammatory bowel disease)     Small bowel obstruction ([facility] [address]          SURGICAL HISTORY     Past Surgical History:   Procedure Appearance: She is not ill-appearing or toxic-appearing. HENT:      Head: Normocephalic and atraumatic. Right Ear: External ear normal.      Left Ear: External ear normal.      Nose: Nose normal.      Mouth/Throat:      Mouth: Mucous membranes are moist.      Pharynx: Oropharynx is clear. Eyes:      Conjunctiva/sclera: Conjunctivae normal.   Cardiovascular:      Rate and Rhythm: Regular rhythm. Tachycardia present. Pulses: Normal pulses. Heart sounds: Normal heart sounds. Pulmonary:      Effort: Pulmonary effort is normal. No respiratory distress. Breath sounds: Normal breath sounds. Abdominal:      General: Abdomen is flat. Bowel sounds are decreased. There is distension. Palpations: Abdomen is soft. Tenderness: There is abdominal tenderness. There is no guarding or rebound. Musculoskeletal:         General: Normal range of motion. Cervical back: Normal range of motion and neck supple. Skin:     General: Skin is warm and dry. Capillary Refill: Capillary refill takes less than 2 seconds. Neurological:      Mental Status: She is alert and oriented to person, place, and time.    Psychiatric:         Mood and Affect: Mood normal.         Behavior: Behavior normal.       DIAGNOSTIC RESULTS   LABS:    Labs Reviewed   CBC WITH AUTO DIFFERENTIAL - Abnormal; Notable for the following components:       Result Value    RDW 15.6 (*)     Neutrophils Absolute 8.7 (*)     Lymphocytes Absolute 0.8 (*)     All other components within normal limits   COMPREHENSIVE METABOLIC PANEL W/ REFLEX TO MG FOR LOW K - Abnormal; Notable for the following components:    Potassium reflex Magnesium 2.9 (*)     CO2 20 (*)     Anion Gap 18 (*)     Glucose 142 (*)     BUN 34 (*)     ALT 44 (*)     All other components within normal limits    Narrative:     Maria Ines Mckeon  [facility] tel. [phone],  Chemistry results called to and read back by FRANCES Serna, 09/06/2022 15:51,  by BRYCE   LIPASE - Abnormal; Notable for the following components:    Lipase 12.0 (*)     All other components within normal limits    Narrative:     Hiral Pfeiffer  [facility] tel. [phone],  Chemistry results called to and read back by FRANCES Alegria, 09/06/2022 15:51,  by [address] [place] [address] [address] - Abnormal; Notable for the following components:    Color, UA DARK YELLOW (*)     Clarity, UA TURBID (*)     Bilirubin Urine MODERATE (*)     Ketones, Urine TRACE (*)     Protein,  (*)     All other components within normal limits   LACTIC ACID   HCG, SERUM, QUALITATIVE   MAGNESIUM    Narrative:     Hiral Pfeiffer  [facility] tel. [phone],  Chemistry results called to and read back by FRANCES Alegria, 09/06/2022 15:51,  by [facility]       When ordered only abnormal lab results are displayed. All other labs were within normal range or not returned as of this dictation. EKG: When ordered, EKG's are interpreted by the Emergency Department Physician in the absence of a cardiologist.  Please see their note for interpretation of EKG. RADIOLOGY:   Non-plain film images such as CT, Ultrasound and MRI are read by the radiologist. Plain radiographic images are visualized and preliminarily interpreted by the ED Provider with the below findings:        Interpretation per the Radiologist below, if available at the time of this note:    CT ABDOMEN PELVIS W IV CONTRAST Additional Contrast? None   Final Result   Progression of chronic high-grade partial small bowel obstruction at the   terminal ileum with rapid transition not significantly changed in appearance   from prior study. Underlying mass lesion at this level is a consideration   though unlikely given the patient's age. Remainder of the CT abdomen and pelvis appears unremarkable. The results were called by Dr. Marcus Ellsworth to [place] [address] on   9/6/2022 at 16:36. No results found.         PROCEDURES   Unless otherwise noted below, none Procedures    CRITICAL CARE TIME       CONSULTS:  IP CONSULT TO GENERAL SURGERY  IP CONSULT TO HOSPITALIST    9935 -Case discussed with Dr. Carmen Mondragon, general surgery, recommends consult GI and admission for management of Crohn's as well, will see in the a.m., agrees with admission to medicine. Will consult medicine for admission. EMERGENCY DEPARTMENT COURSE and DIFFERENTIAL DIAGNOSIS/MDM:   Vitals:    Vitals:    09/06/22 1450   BP: 110/86   Pulse: (!) 122   Resp: 20   Temp: 98.2 °F (36.8 °C)   TempSrc: Oral   SpO2: 98%   Weight: 126 lb (57.2 kg)   Height: 5' 5\" (1.651 m)       Patient was given the following medications:  Medications   lactated ringers bolus (has no administration in time range)   ondansetron (ZOFRAN) injection 4 mg (has no administration in time range)   morphine injection 4 mg (has no administration in time range)   potassium chloride (KLOR-CON M) extended release tablet 60 mEq (has no administration in time range)   iopamidol (ISOVUE-370) 76 % injection 50 mL (50 mLs IntraVENous Given 9/6/22 1613)         Is this patient to be included in the SEP-1 Core Measure due to severe sepsis or septic shock? No   Exclusion criteria - the patient is NOT to be included for SEP-1 Core Measure due to: Infection is not suspected        FINAL IMPRESSION      1. SBO (small bowel obstruction) ([facility] [address]    2. Exacerbation of Crohn's disease with intestinal obstruction ([facility] [address]    3. Hypokalemia          DISPOSITION/PLAN   DISPOSITION Decision To Admit 09/06/2022 04:38:52 PM      PATIENT REFERRED TO:  No follow-up provider specified. DISCHARGE MEDICATIONS:  New Prescriptions    No medications on file       DISCONTINUED MEDICATIONS:  Discontinued Medications    No medications on file              (Please note that portions of this note were completed with a voice recognition program.  Efforts were made to edit the dictations but occasionally words are mis-transcribed. )    Yesy Severino PA-C

## 2022-09-07 VITALS
OXYGEN SATURATION: 97 % | DIASTOLIC BLOOD PRESSURE: 64 MMHG | BODY MASS INDEX: 20.99 KG/M2 | SYSTOLIC BLOOD PRESSURE: 100 MMHG | RESPIRATION RATE: 16 BRPM | TEMPERATURE: 98.3 F | HEART RATE: 84 BPM | WEIGHT: 126 LBS | HEIGHT: 65 IN

## 2022-09-07 LAB
ANION GAP SERPL CALCULATED.3IONS-SCNC: 8 MMOL/L (ref 3–16)
BUN BLDV-MCNC: 21 MG/DL (ref 7–20)
CALCIUM SERPL-MCNC: 8 MG/DL (ref 8.3–10.6)
CHLORIDE BLD-SCNC: 109 MMOL/L (ref 99–110)
CO2: 24 MMOL/L (ref 21–32)
CREAT SERPL-MCNC: 0.6 MG/DL (ref 0.6–1.1)
GFR AFRICAN AMERICAN: >60
GFR NON-AFRICAN AMERICAN: >60
GLUCOSE BLD-MCNC: 128 MG/DL (ref 70–99)
HCT VFR BLD CALC: 34.5 % (ref 36–48)
HEMOGLOBIN: 11.4 G/DL (ref 12–16)
MCH RBC QN AUTO: 31.4 PG (ref 26–34)
MCHC RBC AUTO-ENTMCNC: 33.1 G/DL (ref 31–36)
MCV RBC AUTO: 95 FL (ref 80–100)
PDW BLD-RTO: 15.4 % (ref 12.4–15.4)
PLATELET # BLD: 282 K/UL (ref 135–450)
PMV BLD AUTO: 8.6 FL (ref 5–10.5)
POTASSIUM SERPL-SCNC: 3.7 MMOL/L (ref 3.5–5.1)
RBC # BLD: 3.63 M/UL (ref 4–5.2)
SODIUM BLD-SCNC: 141 MMOL/L (ref 136–145)
URINE CULTURE, ROUTINE: NORMAL
WBC # BLD: 3.6 K/UL (ref 4–11)

## 2022-09-07 PROCEDURE — 99222 1ST HOSP IP/OBS MODERATE 55: CPT | Performed by: SURGERY

## 2022-09-07 PROCEDURE — 2580000003 HC RX 258: Performed by: FAMILY MEDICINE

## 2022-09-07 PROCEDURE — 36415 COLL VENOUS BLD VENIPUNCTURE: CPT

## 2022-09-07 PROCEDURE — 6360000002 HC RX W HCPCS: Performed by: FAMILY MEDICINE

## 2022-09-07 PROCEDURE — 85027 COMPLETE CBC AUTOMATED: CPT

## 2022-09-07 PROCEDURE — 80048 BASIC METABOLIC PNL TOTAL CA: CPT

## 2022-09-07 RX ORDER — METHYLPREDNISOLONE SODIUM SUCCINATE 40 MG/ML
20 INJECTION, POWDER, LYOPHILIZED, FOR SOLUTION INTRAMUSCULAR; INTRAVENOUS EVERY 8 HOURS
Status: DISCONTINUED | OUTPATIENT
Start: 2022-09-07 | End: 2022-09-07 | Stop reason: HOSPADM

## 2022-09-07 RX ORDER — PREDNISONE 20 MG/1
40 TABLET ORAL DAILY
Qty: 60 TABLET | Refills: 1 | Status: SHIPPED | OUTPATIENT
Start: 2022-09-07 | End: 2022-11-06

## 2022-09-07 RX ADMIN — METHYLPREDNISOLONE SODIUM SUCCINATE 40 MG: 40 INJECTION, POWDER, FOR SOLUTION INTRAMUSCULAR; INTRAVENOUS at 10:01

## 2022-09-07 RX ADMIN — MORPHINE SULFATE 2 MG: 2 INJECTION, SOLUTION INTRAMUSCULAR; INTRAVENOUS at 12:56

## 2022-09-07 RX ADMIN — SODIUM CHLORIDE, PRESERVATIVE FREE 10 ML: 5 INJECTION INTRAVENOUS at 10:01

## 2022-09-07 ASSESSMENT — PAIN - FUNCTIONAL ASSESSMENT: PAIN_FUNCTIONAL_ASSESSMENT: ACTIVITIES ARE NOT PREVENTED

## 2022-09-07 ASSESSMENT — PAIN DESCRIPTION - ORIENTATION
ORIENTATION: LEFT;MID
ORIENTATION: LEFT

## 2022-09-07 ASSESSMENT — ENCOUNTER SYMPTOMS
VOMITING: 1
APNEA: 0
EYE DISCHARGE: 0
COLOR CHANGE: 0
NAUSEA: 1
ABDOMINAL DISTENTION: 1
EYE ITCHING: 0
CHEST TIGHTNESS: 0
ABDOMINAL PAIN: 1
BACK PAIN: 0

## 2022-09-07 ASSESSMENT — PAIN DESCRIPTION - FREQUENCY
FREQUENCY: INTERMITTENT
FREQUENCY: CONTINUOUS

## 2022-09-07 ASSESSMENT — PAIN DESCRIPTION - PAIN TYPE
TYPE: ACUTE PAIN
TYPE: ACUTE PAIN

## 2022-09-07 ASSESSMENT — PAIN DESCRIPTION - DESCRIPTORS
DESCRIPTORS: ACHING
DESCRIPTORS: SHARP

## 2022-09-07 ASSESSMENT — PAIN DESCRIPTION - LOCATION
LOCATION: ABDOMEN
LOCATION: ABDOMEN

## 2022-09-07 ASSESSMENT — PAIN SCALES - GENERAL
PAINLEVEL_OUTOF10: 0
PAINLEVEL_OUTOF10: 7
PAINLEVEL_OUTOF10: 5

## 2022-09-07 ASSESSMENT — PAIN DESCRIPTION - ONSET: ONSET: ON-GOING

## 2022-09-07 NOTE — PROGRESS NOTES
Patient is alert and oriented; resting comfortably in bed. Shift assessment completed. Denies any pain at this time. AM meds administered per MAR. The care plan and education has been reviewed and mutually agreed upon with the patient. Standard safety measures in place. 4:34 PM  Patient decided to leave AMA. MD aware. IV removed prior to dc. The care plan and education has been resolved and completed. Patient signs AMA and leaves with all personal belongings.

## 2022-09-07 NOTE — H&P
Hospital Medicine History & Physical      PCP: FABIO Wadsworth - CNP    Date of Admission: 9/6/2022    Date of Service: Pt seen/examined on 9/6/22  Perfect serve and Admitted to Inpatient with expected LOS greater than two midnights due to medical therapy. Chief Complaint: Abdominal pain, vomiting      History Of Present Illness:      32 y.o. with Crohn's disease presented to the emergency room with acute onset of abdominal pain x 1-the pain was severe and generalized. .  Patient states that she had been taking prednisone 60 mg daily for 3 months but she ran out 1 week ago. .  She had associated vomiting, nausea and diarrhea. .  No blood in stool. .  No fevers or chills. .     In the ED, BP was 110/86, pulse 122, respirations 20, temperature 98.2, oxygen saturation 90% room air  CT abdomen showed  Progression of chronic high-grade partial small bowel obstruction at the   terminal ileum with rapid transition not significantly changed in appearance   from prior study. Underlying mass lesion at this level is a consideration   though unlikely given the patient's age. Remainder of the CT abdomen and pelvis appears unremarkable. Past Medical History:          Diagnosis Date    Asthma     Crohn's disease of large intestine with other complication ([facility] [address]     IBD (inflammatory bowel disease)     Small bowel obstruction ([facility] [address]        Past Surgical History:          Procedure Laterality Date    APPENDECTOMY  2017    COLONOSCOPY  08/15/2019    SKIN BIOPSY         Medications Prior to Admission:      Prior to Admission medications    Medication Sig Start Date End Date Taking?  Authorizing Provider   ondansetron (ZOFRAN) 4 MG tablet Take 1 tablet by mouth every 8 hours as needed for Nausea or Vomiting  Patient not taking: Reported on 9/6/2022 6/9/22   Ulises Bowser MD   dicyclomine (BENTYL) 10 MG capsule Take 1 capsule by mouth 4 times daily as needed (abd pain) 6/9/22 6/14/22  Ulises Bowser MD methylPREDNISolone (MEDROL DOSEPACK) 4 MG tablet Take by mouth. 6/9/22   Andrea Rebolledo MD   Omega-3 Fatty Acids (FISH OIL) 1000 MG CAPS Take 1,000 mg by mouth daily  Patient not taking: Reported on 9/6/2022    Historical Provider, MD   Flaxseed, Linseed, (FLAX SEED OIL PO) Take by mouth  Patient not taking: Reported on 9/6/2022    Historical Provider, MD   Prenatal Vit-Fe Fumarate-FA (PRENATAL PO) Take by mouth daily    Historical Provider, MD   APPLE CIDER VINEGAR PO Take by mouth daily  Patient not taking: Reported on 9/6/2022    Historical Provider, MD       Allergies:  Patient has no known allergies. Social History:      The patient currently lives     TOBACCO:   reports that she has never smoked. She has never used smokeless tobacco.  ETOH:   reports current alcohol use. Family History:       Reviewed in detail and negative for DM, CAD, Cancer, CVA. Positive as follows:        Problem Relation Age of Onset    No Known Problems Mother     No Known Problems Father     Cancer Maternal Grandmother     Diabetes Maternal Grandfather     No Known Problems Paternal Grandmother     No Known Problems Paternal Grandfather     No Known Problems Half-Sister     No Known Problems Half-Brother     No Known Problems Half-Sister     No Known Problems Half-Brother     No Known Problems Half-Brother        REVIEW OF SYSTEMS:   Pertinent positives as noted in the HPI. All other systems reviewed and negative. PHYSICAL EXAM:    /65   Pulse 87   Temp 99.1 °F (37.3 °C) (Oral)   Resp 16   Ht 5' 5\" (1.651 m)   Wt 126 lb (57.2 kg)   LMP 08/23/2022   SpO2 99%   BMI 20.97 kg/m²     General appearance:  No apparent distress, appears stated age and cooperative. HEENT:  Normal cephalic, atraumatic without obvious deformity. Pupils equal, round, and reactive to light. Extra ocular muscles intact. Conjunctivae/corneas clear. Neck: Supple, with full range of motion. No jugular venous distention.  Trachea midline. Respiratory:  Normal respiratory effort. Clear to auscultation, bilaterally without Rales/Wheezes/Rhonchi. Cardiovascular:  Regular rate and rhythm with normal S1/S2 without murmurs, rubs or gallops. Abdomen: Soft, non-tender, non-distended with normal bowel sounds. Musculoskeletal:  No clubbing, cyanosis or edema bilaterally. Full range of motion without deformity. Skin: Skin color, texture, turgor normal.  No rashes or lesions. Neurologic:  Neurovascularly intact without any focal sensory/motor deficits. Cranial nerves: II-XII intact, grossly non-focal.  Psychiatric:  Alert and oriented, thought content appropriate, normal insight  Capillary Refill: Brisk,< 3 seconds   Peripheral Pulses: +2 palpable, equal bilaterally       CXR:  I have reviewed the CXR with the following interpretation:   EKG:  I have reviewed the EKG with the following interpretation:     Labs:     Recent Labs     09/06/22  1511 09/07/22  0414   WBC 10.6 3.6*   HGB 15.0 11.4*   HCT 45.4 34.5*    282     Recent Labs     09/06/22  1511 09/06/22  2217 09/07/22  0414     --  141   K 2.9* 3.8 3.7     --  109   CO2 20*  --  24   BUN 34*  --  21*   CREATININE 0.8  --  0.6   CALCIUM 9.1  --  8.0*     Recent Labs     09/06/22  1511   AST 16   ALT 44*   BILITOT 0.5   ALKPHOS 100     No results for input(s): INR in the last 72 hours. No results for input(s): Erika Mould in the last 72 hours. Urinalysis:      Lab Results   Component Value Date/Time    NITRU Negative 09/06/2022 03:14 PM    WBCUA 10-20 09/06/2022 03:14 PM    BACTERIA 3+ 09/06/2022 03:14 PM    RBCUA None seen 09/06/2022 03:14 PM    BLOODU Negative 09/06/2022 03:14 PM    SPECGRAV >=1.030 09/06/2022 03:14 PM    GLUCOSEU Negative 09/06/2022 03:14 PM         ASSESSMENT:      -Partial small bowel obstruction. .. CT abdomen showed progression of chronic high-grade partial small bowel obstruction with a terminal ileum repleted condition not significantly changed     Clinically improved with treatment. .  With IV fluids, bowel rest and IV steroids. GI and general surgery consulted    -Crohn's disease flare--continue Solu-Medrol-patient is not yet on biological therapy, apparently awaiting insurance prior authorization--- GI consulted      -Hypokalemia--repleted            DVT Prophylaxis: Lovenox  Diet: Diet NPO  Code Status: Full Code           Elizabeth Ryan MD    Thank you FABIO Cole - TARI for the opportunity to be involved in this patient's care. If you have any questions or concerns please feel free to contact me at [phone].

## 2022-09-07 NOTE — DISCHARGE SUMMARY
16   Ht 5' 5\" (1.651 m)   Wt 126 lb (57.2 kg)   LMP 08/23/2022   SpO2 97%   BMI 20.97 kg/m²   Gen/overall appearance: Not in acute distress. Alert. Head: Normocephalic, atraumatic  Eyes: EOMI, good acuity  ENT:- Oral mucosa moist  Neck: No JVD, thyromegaly  CVS: Nml S1S2, no MRG, RRR  Pulm: Clear bilaterally. No crackles/wheezes  Gastrointestinal: Soft, NT/ND, +BS  Musculoskeletal: No edema. Warm  Neuro: No focal deficit. Moves extremity spontaneously. Psychiatry: Appropriate affect. Not agitated. Skin: Warm, dry with normal turgor. No rash        Significant Diagnostic Studies:    See aboce        Treatments: As above. Discharge Medications:     Medication List        START taking these medications      predniSONE 20 MG tablet  Commonly known as: DELTASONE  Take 2 tablets by mouth daily            STOP taking these medications      methylPREDNISolone 4 MG tablet  Commonly known as: MEDROL DOSEPACK            ASK your doctor about these medications      APPLE CIDER VINEGAR PO     dicyclomine 10 MG capsule  Commonly known as: Bentyl  Take 1 capsule by mouth 4 times daily as needed (abd pain)     fish oil 1000 MG Caps     FLAX SEED OIL PO     ondansetron 4 MG tablet  Commonly known as: Zofran  Take 1 tablet by mouth every 8 hours as needed for Nausea or Vomiting     PRENATAL PO               Where to Get Your Medications        These medications were sent to [facility] [facility] [phone]  [address]      Hours: 24-hours Phone: [phone]   predniSONE 20 MG tablet         Activity: activity as tolerated  Diet: ADULT DIET; Clear Liquid      Disposition: AMA  Discharged Condition: Stable  Follow Up:   No follow-up provider specified.       Code status:  Full Code         Total time spent on discharge, finalizing medications, referrals and arranging outpatient follow up was more than 45 minutes      Thank you FABIO Garcia - TARI for the opportunity to be involved in this patients care.

## 2022-09-07 NOTE — CONSULTS
[facility]     Chief complaint-abdominal pain    HPI: 78-year-old female who was diagnosed with Crohn's disease about 3 years ago. She has been seen by our service on at least 3 occasions for a partial small bowel obstruction since May 2019. She was last seen in late July of this year with a small bowel obstruction. She was treated with IV steroids and then signed out AMA after 1 day of treatment. She is followed by a gastroenterologist at [address] [place] [address] who currently has her on oral steroids. The plan is for her to eventually switch to Remicade once it is covered by her insurance. She ran out of steroids last week and did not have them for about 1 week. The lack of steroids likely led to her presentation to the emergency room yesterday with sharp and crampy pain across the upper abdomen. The pain was described as severe and nonradiating. Nothing made it better or worse. Associated symptoms include nausea and vomiting. She has been having bowel function. No history of fevers, chills or urinary symptoms. Since admission, she was started on IV steroids. Her abdominal distention and pain has significantly improved and she is already tolerating clear liquids. Past Medical History:   Diagnosis Date    Asthma     Crohn's disease of large intestine with other complication (HCC)     IBD (inflammatory bowel disease)     Small bowel obstruction ([facility] [address]        Past Surgical History:   Procedure Laterality Date    APPENDECTOMY  2017    COLONOSCOPY  08/15/2019    SKIN BIOPSY         Social History     Socioeconomic History    Marital status: Single     Spouse name: Not on file    Number of children: 1    Years of education: Not on file    Highest education level: Not on file   Occupational History    Occupation:       Comment: [employer]    Tobacco Use    Smoking status: Never    Smokeless tobacco: Never   Vaping Use    Vaping Use: Never used   Substance and Sexual Activity    Alcohol use:  Yes Comment: occassionally    Drug use: Never    Sexual activity: Yes     Partners: Male   Other Topics Concern    Not on file   Social History Narrative    Lives with boyfriend, her daughter (5y), and step daughter (9yo)-[birth year] 26      Social Determinants of Health     Financial Resource Strain: Not on file   Food Insecurity: Not on file   Transportation Needs: Not on file   Physical Activity: Not on file   Stress: Not on file   Social Connections: Not on file   Intimate Partner Violence: Not on file   Housing Stability: Not on file       Allergies: No Known Allergies    Prior to Admission medications    Medication Sig Start Date End Date Taking? Authorizing Provider   ondansetron (ZOFRAN) 4 MG tablet Take 1 tablet by mouth every 8 hours as needed for Nausea or Vomiting  Patient not taking: Reported on 9/6/2022 6/9/22   Elidia Garza MD   dicyclomine (BENTYL) 10 MG capsule Take 1 capsule by mouth 4 times daily as needed (abd pain) 6/9/22 6/14/22  Elidia Garza MD   methylPREDNISolone (MEDROL DOSEPACK) 4 MG tablet Take by mouth. 6/9/22   Elidia Garza MD   Omega-3 Fatty Acids (FISH OIL) 1000 MG CAPS Take 1,000 mg by mouth daily  Patient not taking: Reported on 9/6/2022    Historical Provider, MD   Flaxseed Linseed, (FLAX SEED OIL PO) Take by mouth  Patient not taking: Reported on 9/6/2022    Historical Provider, MD   Prenatal Vit-Fe Fumarate-FA (PRENATAL PO) Take by mouth daily    Historical Provider, MD   APPLE CIDER VINEGAR PO Take by mouth daily  Patient not taking: Reported on 9/6/2022    Historical Provider, MD       Principal Problem: Bowel obstruction (HCC)  Resolved Problems:    * No resolved hospital problems. *      Blood pressure 100/64, pulse 84, temperature 98.3 °F (36.8 °C), temperature source Oral, resp. rate 16, height 5' 5\" (1.651 m), weight 126 lb (57.2 kg), last menstrual period 08/23/2022, SpO2 97 %. Review of Systems   Constitutional:  Positive for appetite change.  Negative for activity change, chills and fever. HENT:  Negative for congestion and dental problem. Eyes:  Negative for discharge and itching. Respiratory:  Negative for apnea and chest tightness. Cardiovascular:  Negative for chest pain and leg swelling. Gastrointestinal:  Positive for abdominal distention, abdominal pain, nausea and vomiting. Endocrine: Negative for cold intolerance and heat intolerance. Genitourinary:  Negative for difficulty urinating and dyspareunia. Musculoskeletal:  Negative for arthralgias and back pain. Skin:  Negative for color change and pallor. Allergic/Immunologic: Negative for environmental allergies and food allergies. Neurological:  Negative for dizziness and facial asymmetry. Hematological:  Negative for adenopathy. Does not bruise/bleed easily. Psychiatric/Behavioral:  Negative for agitation and behavioral problems. Physical Exam  Constitutional:       Appearance: She is well-developed. HENT:      Head: Normocephalic and atraumatic. Right Ear: External ear normal.      Left Ear: External ear normal.   Eyes:      Conjunctiva/sclera: Conjunctivae normal.   Cardiovascular:      Rate and Rhythm: Normal rate and regular rhythm. Pulmonary:      Effort: Pulmonary effort is normal.      Breath sounds: Normal breath sounds. Abdominal:      General: There is distension. Palpations: Abdomen is soft. Tenderness: There is no abdominal tenderness. Musculoskeletal:         General: Normal range of motion. Cervical back: Normal range of motion and neck supple. Skin:     General: Skin is warm and dry. Neurological:      Mental Status: She is alert and oriented to person, place, and time. Psychiatric:         Behavior: Behavior normal.       Assessment:  32year old female with Crohn's admitted with upper abdominal pain associated with nausea and vomiting. The Crohn's disease is being treated with steroids.   The patient was not taking them for about 1 week and this likely led to her current symptomatology. CAT scan shows markedly dilated small bowel loops which are likely chronic in nature. Her abdominal pain and distention are already improving with IV steroids. She is tolerating a clear liquid diet.     Plan:  No surgical indications at the present time  Already improving with conservative management  Will defer management to GI team  Please call if questions    Eamon Amezquita MD  9/7/2022 03-Dec-2021 18:00

## 2022-09-07 NOTE — ED NOTES
Pt states that usually she gets steroids when she is in here with crohn's.  Pt wanted provider to be informed     Norma Hugo RN  09/06/22 2041

## 2022-09-07 NOTE — CONSULTS
Diagnosis Date    Asthma     Crohn's disease of large intestine with other complication ([facility] [address]     IBD (inflammatory bowel disease)     Small bowel obstruction [facility])       Past Surgical History:   Procedure Laterality Date    APPENDECTOMY  2017    COLONOSCOPY  08/15/2019    SKIN BIOPSY        Past Endoscopic History: see hpi    Admission Meds  No current facility-administered medications on file prior to encounter. Current Outpatient Medications on File Prior to Encounter   Medication Sig Dispense Refill    ondansetron (ZOFRAN) 4 MG tablet Take 1 tablet by mouth every 8 hours as needed for Nausea or Vomiting (Patient not taking: Reported on 9/6/2022) 12 tablet 0    dicyclomine (BENTYL) 10 MG capsule Take 1 capsule by mouth 4 times daily as needed (abd pain) 20 capsule 0    methylPREDNISolone (MEDROL DOSEPACK) 4 MG tablet Take by mouth.  1 kit 0    Omega-3 Fatty Acids (FISH OIL) 1000 MG CAPS Take 1,000 mg by mouth daily (Patient not taking: Reported on 9/6/2022)      Flaxseed, Linseed, (FLAX SEED OIL PO) Take by mouth (Patient not taking: Reported on 9/6/2022)      Prenatal Vit-Fe Fumarate-FA (PRENATAL PO) Take by mouth daily      APPLE CIDER VINEGAR PO Take by mouth daily (Patient not taking: Reported on 9/6/2022)              Allergies  No Known Allergies   Social   Social History     Tobacco Use    Smoking status: Never    Smokeless tobacco: Never   Substance Use Topics    Alcohol use: Yes     Comment: occassionally        Family History   Problem Relation Age of Onset    No Known Problems Mother     No Known Problems Father     Cancer Maternal Grandmother     Diabetes Maternal Grandfather     No Known Problems Paternal Grandmother     No Known Problems Paternal Grandfather     No Known Problems Half-Sister     No Known Problems Half-Brother     No Known Problems Half-Sister     No Known Problems Half-Brother     No Known Problems Half-Brother         Review of Systems  Constitutional: negative for fevers, chills, sweats    Ears, nose, mouth, throat, and face: negative for nasal congestion and sore throat   Respiratory: negative for cough and shortness of breath   Cardiovascular: negative for chest pain and dyspnea   Gastrointestinal: see hpi   Genitourinary:negative for dysuria and frequency   Integument/breast: negative for pruritus and rash   Hematologic/lymphatic: negative for bleeding and easy bruising   Musculoskeletal:negative for arthralgias and myalgias   Neurological: negative for dizziness and weakness       Physical Exam  Blood pressure 107/65, pulse 87, temperature 99.1 °F (37.3 °C), temperature source Oral, resp. rate 16, height 5' 5\" (1.651 m), weight 126 lb (57.2 kg), last menstrual period 08/23/2022, SpO2 99 %. General appearance: alert, cooperative, no distress, appears stated age  Eyes: Anicteric  Head: Normocephalic, without obvious abnormality  Lungs: clear to auscultation bilaterally, Normal Effort  Heart: regular rate and rhythm, normal S1 and S2, no murmurs or rubs  Abdomen: mild distention but soft, mild RLQ tenderness. Bowel sounds normal. No masses,  no organomegaly. Extremities: atraumatic, no cyanosis or edema  Skin: warm and dry, no jaundice  Neuro: Grossly intact, A&OX3  Musculoskeletal: 5/5  strength BUE      Data Review:    Recent Labs     09/06/22  1511 09/07/22  0414   WBC 10.6 3.6*   HGB 15.0 11.4*   HCT 45.4 34.5*   MCV 95.1 95.0    282     Recent Labs     09/06/22  1511 09/06/22  2217 09/07/22  0414     --  141   K 2.9* 3.8 3.7     --  109   CO2 20*  --  24   BUN 34*  --  21*   CREATININE 0.8  --  0.6     Recent Labs     09/06/22  1511   AST 16   ALT 44*   BILITOT 0.5   ALKPHOS 100     Recent Labs     09/06/22  1511   LIPASE 12.0*     No results for input(s): PROTIME, INR in the last 72 hours. No results for input(s): PTT in the last 72 hours. No results for input(s): OCCULTBLD in the last 72 hours.     Imaging Studies:               CT-scan of abdomen and pelvis w iv contrast 9/6/22:  Impression   Progression of chronic high-grade partial small bowel obstruction at the   terminal ileum with rapid transition not significantly changed in appearance   from prior study. Underlying mass lesion at this level is a consideration   though unlikely given the patient's age. Remainder of the CT abdomen and pelvis appears unremarkable. Assessment:     Principal Problem: Bowel obstruction (HCC)  Resolved Problems:    * No resolved hospital problems. *    Crohn's disease with bowel obstruction -history of Crohn's disease involving the TI and right colon. See Dr Bienveniod Greenberg at [facility]. Recently stopped prednisone and is on budesonide. Awaiting approval for Humira. CT here with pSBO at the terminal ileum. Passing flatus and no further nausea or vomiting today. Recommendations:   -Solu-Medrol 20 mg IV 3 times daily  -Trial of clear liquids  -Patient states she needs to go home to be with her daughter tonight. If she is DC home, recommend DC on prednisone 40 mg daily.  -Follow-up with her regular GI    Discussed with Dr. Kailyn Chopra, PA-C  [address]      I have personally performed a face to face diagnostic evaluation on this patient. I have interviewed and examined the patient and I agree with the findings and recommended plan of care. In summary, my findings and plan are the following:  crohn's disease of the TI and right colon. Seeing [facility]. Pt is in process of trying to start Remicade. Presented with sbo but now feels better. Ab nt, nd.  Will start clears, cont steroids. F/u with her GI.          Danial Greene MD  [address]

## 2022-09-07 NOTE — PROGRESS NOTES
Pt admitted from er with nausea and abd pain related to [address] Pt takes oral steriods at home along withprenatal vitamins. Received morphine for pain iv fluids and potassium infusion. Pt alert and orientated, dkin dry and intact, bowel sound active x4 quad. Ambulated independly.

## 2022-09-07 NOTE — PLAN OF CARE
Problem: Gastrointestinal - Adult  Goal: Minimal or absence of nausea and vomiting  Outcome: Progressing  Goal: Maintains or returns to baseline bowel function  Outcome: Progressing

## 2022-10-21 ENCOUNTER — HOSPITAL ENCOUNTER (EMERGENCY)
Age: 27
Discharge: HOME | End: 2022-10-22
Payer: MEDICARE

## 2022-10-21 DIAGNOSIS — R10.30 LOWER ABDOMINAL PAIN: Primary | ICD-10-CM

## 2022-10-21 LAB
BASOPHILS ABSOLUTE: 0 K/UL (ref 0–0.2)
BASOPHILS RELATIVE PERCENT: 0.3 %
EOSINOPHILS ABSOLUTE: 0 K/UL (ref 0–0.6)
EOSINOPHILS RELATIVE PERCENT: 0.1 %
HCT VFR BLD CALC: 41.6 % (ref 36–48)
HEMOGLOBIN: 13.8 G/DL (ref 12–16)
LYMPHOCYTES ABSOLUTE: 2.1 K/UL (ref 1–5.1)
LYMPHOCYTES RELATIVE PERCENT: 23.8 %
MCH RBC QN AUTO: 31 PG (ref 26–34)
MCHC RBC AUTO-ENTMCNC: 33.1 G/DL (ref 31–36)
MCV RBC AUTO: 93.6 FL (ref 80–100)
MONOCYTES ABSOLUTE: 0.7 K/UL (ref 0–1.3)
MONOCYTES RELATIVE PERCENT: 8.3 %
NEUTROPHILS ABSOLUTE: 6 K/UL (ref 1.7–7.7)
NEUTROPHILS RELATIVE PERCENT: 67.5 %
PDW BLD-RTO: 15.1 % (ref 12.4–15.4)
PLATELET # BLD: 307 K/UL (ref 135–450)
PMV BLD AUTO: 9.2 FL (ref 5–10.5)
RBC # BLD: 4.45 M/UL (ref 4–5.2)
WBC # BLD: 8.8 K/UL (ref 4–11)

## 2022-10-21 PROCEDURE — 80053 COMPREHEN METABOLIC PANEL: CPT

## 2022-10-21 PROCEDURE — 99284 EMERGENCY DEPT VISIT MOD MDM: CPT

## 2022-10-21 PROCEDURE — 83690 ASSAY OF LIPASE: CPT

## 2022-10-21 PROCEDURE — 84703 CHORIONIC GONADOTROPIN ASSAY: CPT

## 2022-10-21 PROCEDURE — 96374 THER/PROPH/DIAG INJ IV PUSH: CPT

## 2022-10-21 PROCEDURE — 85025 COMPLETE CBC W/AUTO DIFF WBC: CPT

## 2022-10-21 PROCEDURE — 36415 COLL VENOUS BLD VENIPUNCTURE: CPT

## 2022-10-21 PROCEDURE — 6360000002 HC RX W HCPCS: Performed by: PHYSICIAN ASSISTANT

## 2022-10-21 PROCEDURE — 96375 TX/PRO/DX INJ NEW DRUG ADDON: CPT

## 2022-10-21 PROCEDURE — 83735 ASSAY OF MAGNESIUM: CPT

## 2022-10-21 RX ORDER — ONDANSETRON 2 MG/ML
4 INJECTION INTRAMUSCULAR; INTRAVENOUS EVERY 6 HOURS PRN
Status: DISCONTINUED | OUTPATIENT
Start: 2022-10-21 | End: 2022-10-22 | Stop reason: HOSPADM

## 2022-10-21 RX ORDER — METHYLPREDNISOLONE SODIUM SUCCINATE 125 MG/2ML
60 INJECTION, POWDER, LYOPHILIZED, FOR SOLUTION INTRAMUSCULAR; INTRAVENOUS ONCE
Status: COMPLETED | OUTPATIENT
Start: 2022-10-21 | End: 2022-10-22

## 2022-10-21 RX ORDER — MORPHINE SULFATE 4 MG/ML
4 INJECTION, SOLUTION INTRAMUSCULAR; INTRAVENOUS EVERY 30 MIN PRN
Status: DISCONTINUED | OUTPATIENT
Start: 2022-10-21 | End: 2022-10-22 | Stop reason: HOSPADM

## 2022-10-21 RX ORDER — 0.9 % SODIUM CHLORIDE 0.9 %
1000 INTRAVENOUS SOLUTION INTRAVENOUS ONCE
Status: COMPLETED | OUTPATIENT
Start: 2022-10-21 | End: 2022-10-22

## 2022-10-21 RX ADMIN — ONDANSETRON 4 MG: 2 INJECTION INTRAMUSCULAR; INTRAVENOUS at 23:59

## 2022-10-21 ASSESSMENT — PAIN - FUNCTIONAL ASSESSMENT: PAIN_FUNCTIONAL_ASSESSMENT: 0-10

## 2022-10-21 ASSESSMENT — PAIN SCALES - GENERAL: PAINLEVEL_OUTOF10: 10

## 2022-10-22 ENCOUNTER — APPOINTMENT (OUTPATIENT)
Dept: CT IMAGING | Age: 27
End: 2022-10-22
Payer: MEDICARE

## 2022-10-22 VITALS
OXYGEN SATURATION: 99 % | DIASTOLIC BLOOD PRESSURE: 78 MMHG | SYSTOLIC BLOOD PRESSURE: 112 MMHG | RESPIRATION RATE: 18 BRPM | WEIGHT: 131 LBS | TEMPERATURE: 99.3 F | HEART RATE: 90 BPM | BODY MASS INDEX: 21.8 KG/M2

## 2022-10-22 LAB
A/G RATIO: 1.5 (ref 1.1–2.2)
ALBUMIN SERPL-MCNC: 4.1 G/DL (ref 3.4–5)
ALP BLD-CCNC: 59 U/L (ref 40–129)
ALT SERPL-CCNC: 18 U/L (ref 10–40)
ANION GAP SERPL CALCULATED.3IONS-SCNC: 15 MMOL/L (ref 3–16)
AST SERPL-CCNC: 21 U/L (ref 15–37)
BILIRUB SERPL-MCNC: 0.4 MG/DL (ref 0–1)
BUN BLDV-MCNC: 19 MG/DL (ref 7–20)
CALCIUM SERPL-MCNC: 9.1 MG/DL (ref 8.3–10.6)
CHLORIDE BLD-SCNC: 106 MMOL/L (ref 99–110)
CO2: 18 MMOL/L (ref 21–32)
CREAT SERPL-MCNC: 0.6 MG/DL (ref 0.6–1.1)
GFR SERPL CREATININE-BSD FRML MDRD: >60 ML/MIN/{1.73_M2}
GLUCOSE BLD-MCNC: 95 MG/DL (ref 70–99)
HCG QUALITATIVE: NEGATIVE
LIPASE: 24 U/L (ref 13–60)
MAGNESIUM: 2 MG/DL (ref 1.8–2.4)
POTASSIUM SERPL-SCNC: 3.9 MMOL/L (ref 3.5–5.1)
SODIUM BLD-SCNC: 139 MMOL/L (ref 136–145)
TOTAL PROTEIN: 6.9 G/DL (ref 6.4–8.2)

## 2022-10-22 PROCEDURE — 6360000002 HC RX W HCPCS: Performed by: PHYSICIAN ASSISTANT

## 2022-10-22 PROCEDURE — 2580000003 HC RX 258: Performed by: PHYSICIAN ASSISTANT

## 2022-10-22 RX ADMIN — MORPHINE SULFATE 4 MG: 4 INJECTION, SOLUTION INTRAMUSCULAR; INTRAVENOUS at 00:01

## 2022-10-22 RX ADMIN — SODIUM CHLORIDE 1000 ML: 9 INJECTION, SOLUTION INTRAVENOUS at 00:04

## 2022-10-22 RX ADMIN — METHYLPREDNISOLONE SODIUM SUCCINATE 60 MG: 125 INJECTION, POWDER, FOR SOLUTION INTRAMUSCULAR; INTRAVENOUS at 00:02

## 2022-10-22 ASSESSMENT — PAIN - FUNCTIONAL ASSESSMENT: PAIN_FUNCTIONAL_ASSESSMENT: NONE - DENIES PAIN

## 2022-10-22 ASSESSMENT — ENCOUNTER SYMPTOMS
VOMITING: 1
SHORTNESS OF BREATH: 0
COUGH: 0
ABDOMINAL PAIN: 1
DIARRHEA: 1
RHINORRHEA: 0
NAUSEA: 0

## 2022-10-22 ASSESSMENT — PAIN SCALES - GENERAL: PAINLEVEL_OUTOF10: 10

## 2022-10-22 NOTE — ED PROVIDER NOTES
[facility]        Pt Name: Yan Sims  MRN: [id number]  [place] 1995  Date of evaluation: 10/21/2022  Provider: Donaldo Ortega PA-C  PCP: FABIO Landeros CNP  Note Started: 1:43 AM EDT       TYLER. I have evaluated this patient. My supervising physician was available for consultation. CHIEF COMPLAINT       Chief Complaint   Patient presents with    Abdominal Pain     Pt has crohn's disease, states she is having a flare up. Abdominal pain onset last night. HISTORY OF PRESENT ILLNESS   (Location, Timing/Onset, Context/Setting, Quality, Duration, Modifying Factors, Severity, Associated Signs and Symptoms)  Note limiting factors. Chief Complaint: Abdominal pain    Yan Sims is a 32 y.o. female who presents to the emergency department today for evaluation for abdominal pain. The patient has a history for evaluation for abdominal pain. The patient states that yesterday she began to experience a sharp pain across her lower abdomen. The patient states that her pain is sharp, cramping and is currently rated as a 10/10. Patient denies any known alleviating or grading factors. The patient has a history of Crohn's disease, and she states that she is followed by Dr. Janeth Lee. The patient states that she is currently on steroids the patient states that she is nauseous, and she has had some vomiting, as well as diarrhea. She denies any blood in the stool black) to her before. She denies any bilious emesis, hematemesis or coffee-ground emesis. Patient denied any fever chills per no cough or congestion. She denies any urinary symptoms. Patient otherwise has no other complaints at that time. Nursing Notes were all reviewed and agreed with or any disagreements were addressed in the HPI.     REVIEW OF SYSTEMS    (2-9 systems for level 4, 10 or more for level 5)     Review of Systems   Constitutional:  Negative for activity change, appetite change, chills and fever. HENT:  Negative for congestion and rhinorrhea. Respiratory:  Negative for cough and shortness of breath. Cardiovascular:  Negative for chest pain. Gastrointestinal:  Positive for abdominal pain, diarrhea and vomiting. Negative for nausea. Genitourinary:  Negative for difficulty urinating, dysuria and hematuria. Positives and Pertinent negatives as per HPI. Except as noted above in the ROS, all other systems were reviewed and negative. PAST MEDICAL HISTORY     Past Medical History:   Diagnosis Date    Asthma     Crohn's disease of large intestine with other complication ([facility] [address]     IBD (inflammatory bowel disease)     Small bowel obstruction ([facility] [address]          SURGICAL HISTORY     Past Surgical History:   Procedure Laterality Date    APPENDECTOMY  2017    COLONOSCOPY  08/15/2019    SKIN BIOPSY           CURRENTMEDICATIONS       Discharge Medication List as of 10/22/2022  1:18 AM        CONTINUE these medications which have NOT CHANGED    Details   predniSONE (DELTASONE) 20 MG tablet Take 2 tablets by mouth daily, Disp-60 tablet, R-1Normal      ondansetron (ZOFRAN) 4 MG tablet Take 1 tablet by mouth every 8 hours as needed for Nausea or Vomiting, Disp-12 tablet, R-0Normal      dicyclomine (BENTYL) 10 MG capsule Take 1 capsule by mouth 4 times daily as needed (abd pain), Disp-20 capsule, R-0Normal      Omega-3 Fatty Acids (FISH OIL) 1000 MG CAPS Take 1,000 mg by mouth dailyHistorical Med      Flaxseed, Linseed, (FLAX SEED OIL PO) Take by mouthHistorical Med      Prenatal Vit-Fe Fumarate-FA (PRENATAL PO) Take by mouth dailyHistorical Med      APPLE CIDER VINEGAR PO Take by mouth dailyHistorical Med               ALLERGIES     Patient has no known allergies.     FAMILYHISTORY       Family History   Problem Relation Age of Onset    No Known Problems Mother     No Known Problems Father     Cancer Maternal Grandmother     Diabetes Maternal Grandfather     No Known Problems Paternal Grandmother     No Known Problems Paternal Grandfather     No Known Problems Half-Sister     No Known Problems Half-Brother     No Known Problems Half-Sister     No Known Problems Half-Brother     No Known Problems Half-Brother           SOCIAL HISTORY       Social History     Tobacco Use    Smoking status: Never    Smokeless tobacco: Never   Vaping Use    Vaping Use: Never used   Substance Use Topics    Alcohol use: Yes     Comment: occassionally    Drug use: Never       SCREENINGS    [place] Coma Scale  Eye Opening: Spontaneous  Best Verbal Response: Oriented  Best Motor Response: Obeys commands  [place] Coma Scale Score: 15        PHYSICAL EXAM    (up to 7 for level 4, 8 or more for level 5)     ED Triage Vitals [10/21/22 2252]   BP Temp Temp Source Heart Rate Resp SpO2 Height Weight   112/78 99.3 °F (37.4 °C) Oral (!) 111 18 98 % -- 131 lb (59.4 kg)       Physical Exam  Vitals and nursing note reviewed. Constitutional:       Appearance: She is well-developed. She is not diaphoretic. HENT:      Head: Normocephalic and atraumatic. Right Ear: External ear normal.      Left Ear: External ear normal.      Nose: Nose normal.   Eyes:      General:         Right eye: No discharge. Left eye: No discharge. Neck:      Trachea: No tracheal deviation. Cardiovascular:      Rate and Rhythm: Normal rate and regular rhythm. Heart sounds: No murmur heard. Pulmonary:      Effort: Pulmonary effort is normal. No respiratory distress. Breath sounds: Normal breath sounds. No wheezing or rales. Abdominal:      General: Bowel sounds are normal. There is no distension. Palpations: Abdomen is soft. Tenderness: There is abdominal tenderness in the right lower quadrant, suprapubic area and left lower quadrant. There is no guarding or rebound. Musculoskeletal:         General: Normal range of motion. Cervical back: Normal range of motion and neck supple.    Skin: General: Skin is warm and dry. Neurological:      Mental Status: She is alert and oriented to person, place, and time. Psychiatric:         Behavior: Behavior normal.       DIAGNOSTIC RESULTS   LABS:    Labs Reviewed   COMPREHENSIVE METABOLIC PANEL - Abnormal; Notable for the following components:       Result Value    CO2 18 (*)     All other components within normal limits   CBC WITH AUTO DIFFERENTIAL   LIPASE   HCG, SERUM, QUALITATIVE   MAGNESIUM   URINALYSIS WITH REFLEX TO CULTURE       When ordered only abnormal lab results are displayed. All other labs were within normal range or not returned as of this dictation. EKG: When ordered, EKG's are interpreted by the Emergency Department Physician in the absence of a cardiologist.  Please see their note for interpretation of EKG. RADIOLOGY:   Non-plain film images such as CT, Ultrasound and MRI are read by the radiologist. Plain radiographic images are visualized and preliminarily interpreted by the ED Provider with the below findings:        Interpretation per the Radiologist below, if available at the time of this note:    No orders to display     No results found.         PROCEDURES   Unless otherwise noted below, none     Procedures    CRITICAL CARE TIME       CONSULTS:  None      EMERGENCY DEPARTMENT COURSE and DIFFERENTIAL DIAGNOSIS/MDM:   Vitals:    Vitals:    10/21/22 2252 10/22/22 0004 10/22/22 0024   BP: 112/78     Pulse: (!) 111 (!) 106 90   Resp: 18     Temp: 99.3 °F (37.4 °C)     TempSrc: Oral     SpO2: 98%  99%   Weight: 131 lb (59.4 kg)         Patient was given the following medications:  Medications   morphine injection 4 mg (4 mg IntraVENous Given 10/22/22 0001)   ondansetron (ZOFRAN) injection 4 mg (4 mg IntraVENous Given 10/21/22 3419)   0.9 % sodium chloride bolus (0 mLs IntraVENous Stopped 10/22/22 0113)   methylPREDNISolone sodium (SOLU-MEDROL) injection 60 mg (60 mg IntraVENous Given 10/22/22 0002)         Is this patient to be included in the SEP-1 Core Measure due to severe sepsis or septic shock? No   Exclusion criteria - the patient is NOT to be included for SEP-1 Core Measure due to: Infection is not suspected    Briefly, this is a 58-year-old female who presents to the emergency department today for evaluation for abdominal pain. Patient has been experiencing a lower abdominal pain, and cramping since yesterday. Associated vomiting and diarrhea. History of Crohn's disease and symptoms today feel similar to Crohn's exacerbations that she has had in the past.    On examination, she does have tenderness across her lower abdomen there is no rebound or guarding. No peritoneal signs    CBC shows no evidence of leukocytosis or anemia. CMP is unremarkable, CO2 of 18 however she was given IV fluids. Lipase is normal.  Pregnancy is negative, magnesium level is normal    I did recommend a CT of the abdomen and pelvis that she has had multiple bowel obstructions in the past.  The patient states that she is overall feeling better after morphine, Zofran and Solu-Medrol here. Patient is declining a CT as she is concerned about the risk of radiation. I did discuss with her the risk/benefit of radiation to ensure that she does not have a bowel obstruction. Patient is continuing to decline a CT. Repeat abdominal examination, her abdomen is now benign. We did discuss a CT again and she is declining, she does understand the risks of declining this. She states that she will call her gastroenterologist in the morning, and she states that she otherwise has medications at home and does not require any other medications at this time. Strict return precautions given, the patient was understanding is agreeable with plan. Stable for discharge.    Results for orders placed or performed during the hospital encounter of 10/21/22   CBC with Auto Differential   Result Value Ref Range    WBC 8.8 4.0 - 11.0 K/uL    RBC 4.45 4.00 - 5.20 M/uL Hemoglobin 13.8 12.0 - 16.0 g/dL    Hematocrit 41.6 36.0 - 48.0 %    MCV 93.6 80.0 - 100.0 fL    MCH 31.0 26.0 - 34.0 pg    MCHC 33.1 31.0 - 36.0 g/dL    RDW 15.1 12.4 - 15.4 %    Platelets 461 247 - 711 K/uL    MPV 9.2 5.0 - 10.5 fL    Neutrophils % 67.5 %    Lymphocytes % 23.8 %    Monocytes % 8.3 %    Eosinophils % 0.1 %    Basophils % 0.3 %    Neutrophils Absolute 6.0 1.7 - 7.7 K/uL    Lymphocytes Absolute 2.1 1.0 - 5.1 K/uL    Monocytes Absolute 0.7 0.0 - 1.3 K/uL    Eosinophils Absolute 0.0 0.0 - 0.6 K/uL    Basophils Absolute 0.0 0.0 - 0.2 K/uL   Comprehensive Metabolic Panel   Result Value Ref Range    Sodium 139 136 - 145 mmol/L    Potassium 3.9 3.5 - 5.1 mmol/L    Chloride 106 99 - 110 mmol/L    CO2 18 (L) 21 - 32 mmol/L    Anion Gap 15 3 - 16    Glucose 95 70 - 99 mg/dL    BUN 19 7 - 20 mg/dL    Creatinine 0.6 0.6 - 1.1 mg/dL    Est, Glom Filt Rate >60 >60    Calcium 9.1 8.3 - 10.6 mg/dL    Total Protein 6.9 6.4 - 8.2 g/dL    Albumin 4.1 3.4 - 5.0 g/dL    Albumin/Globulin Ratio 1.5 1.1 - 2.2    Total Bilirubin 0.4 0.0 - 1.0 mg/dL    Alkaline Phosphatase 59 40 - 129 U/L    ALT 18 10 - 40 U/L    AST 21 15 - 37 U/L   Lipase   Result Value Ref Range    Lipase 24.0 13.0 - 60.0 U/L   HCG Qualitative, Serum   Result Value Ref Range    hCG Qual Negative Detects HCG level >10 MIU/mL   Magnesium   Result Value Ref Range    Magnesium 2.00 1.80 - 2.40 mg/dL         I estimate there is LOW risk acute appendicitis, acute cholecystitis, cholangitis, pancreatitis, diverticulitis, perforated viscus, perforated ulcer, colitis, C. diff colitis, ischemic colitis, bowel obstruction, acute dissection, thus I consider the discharge disposition reasonable. Also, there is no evidence or peritonitis, sepsis, or toxicity. Charles Mejía and I have discussed the diagnosis and risks, and we agree with discharging home to follow-up with their primary doctor.  We also discussed returning to the Emergency Department immediately if new or worsening symptoms occur. We have discussed the symptoms which are most concerning (e.g., bloody stool, fever, changing or worsening pain, vomiting) that necessitate immediate return. FINAL IMPRESSION      1.  Lower abdominal pain          DISPOSITION/PLAN   DISPOSITION Decision To Discharge 10/22/2022 01:13:23 AM      PATIENT REFERRED TO:  FABIO Aguirre CNP [address] [place] [address]    Schedule an appointment as soon as possible for a visit in 2 days      [facility] Emergency Department  [address] [place] [address]  [phone]    As needed, If symptoms worsen      DISCHARGE MEDICATIONS:  Discharge Medication List as of 10/22/2022  1:18 AM          DISCONTINUED MEDICATIONS:  Discharge Medication List as of 10/22/2022  1:18 AM                 (Please note that portions of this note were completed with a voice recognition program.  Efforts were made to edit the dictations but occasionally words are mis-transcribed.)    Jody Velasco PA-C (electronically signed)            Jody Velasco PA-C  10/22/22 9485

## 2023-04-27 NOTE — TELEPHONE ENCOUNTER
----- Message from Lianet Zepeda sent at 11/4/2021  2:00 PM EDT -----  Subject: Refill Request    QUESTIONS  Name of Medication? methylPREDNISolone (MEDROL DOSEPACK) 4 MG tablet  Patient-reported dosage and instructions? dose pack   How many days do you have left? 0  [facility]? [id number]  Pharmacy phone number (if available)? [phone]  Additional Information for Provider? Pt requesting medication be done by   today she is going out of town.   ---------------------------------------------------------------------------  --------------  [address]  What is the best way for the office to contact you? OK to leave message on   voicemail  Preferred Call Back Phone Number?  [phone] Drysol Counseling:  I discussed with the patient the risks of drysol/aluminum chloride including but not limited to skin rash, itching, irritation, burning.

## 2023-05-17 ENCOUNTER — OFFICE VISIT (OUTPATIENT)
Dept: FAMILY MEDICINE CLINIC | Age: 28
End: 2023-05-17

## 2023-05-17 VITALS
SYSTOLIC BLOOD PRESSURE: 110 MMHG | RESPIRATION RATE: 16 BRPM | HEART RATE: 71 BPM | WEIGHT: 155.2 LBS | BODY MASS INDEX: 25.86 KG/M2 | HEIGHT: 65 IN | OXYGEN SATURATION: 100 % | DIASTOLIC BLOOD PRESSURE: 64 MMHG

## 2023-05-17 DIAGNOSIS — K50.10 CROHN'S DISEASE OF COLON WITHOUT COMPLICATION (HCC): ICD-10-CM

## 2023-05-17 DIAGNOSIS — Z00.129 ENCOUNTER FOR ROUTINE CHILD HEALTH EXAMINATION WITHOUT ABNORMAL FINDINGS: Primary | ICD-10-CM

## 2023-05-17 SDOH — ECONOMIC STABILITY: HOUSING INSECURITY
IN THE LAST 12 MONTHS, WAS THERE A TIME WHEN YOU DID NOT HAVE A STEADY PLACE TO SLEEP OR SLEPT IN A SHELTER (INCLUDING NOW)?: NO

## 2023-05-17 SDOH — ECONOMIC STABILITY: INCOME INSECURITY: HOW HARD IS IT FOR YOU TO PAY FOR THE VERY BASICS LIKE FOOD, HOUSING, MEDICAL CARE, AND HEATING?: NOT HARD AT ALL

## 2023-05-17 SDOH — ECONOMIC STABILITY: FOOD INSECURITY: WITHIN THE PAST 12 MONTHS, YOU WORRIED THAT YOUR FOOD WOULD RUN OUT BEFORE YOU GOT MONEY TO BUY MORE.: NEVER TRUE

## 2023-05-17 SDOH — ECONOMIC STABILITY: FOOD INSECURITY: WITHIN THE PAST 12 MONTHS, THE FOOD YOU BOUGHT JUST DIDN'T LAST AND YOU DIDN'T HAVE MONEY TO GET MORE.: NEVER TRUE

## 2023-05-17 ASSESSMENT — PATIENT HEALTH QUESTIONNAIRE - PHQ9
2. FEELING DOWN, DEPRESSED OR HOPELESS: 0
SUM OF ALL RESPONSES TO PHQ QUESTIONS 1-9: 0
SUM OF ALL RESPONSES TO PHQ QUESTIONS 1-9: 0
SUM OF ALL RESPONSES TO PHQ9 QUESTIONS 1 & 2: 0
SUM OF ALL RESPONSES TO PHQ QUESTIONS 1-9: 0
1. LITTLE INTEREST OR PLEASURE IN DOING THINGS: 0
SUM OF ALL RESPONSES TO PHQ QUESTIONS 1-9: 0

## 2023-05-17 ASSESSMENT — ENCOUNTER SYMPTOMS
ABDOMINAL PAIN: 0
VOMITING: 0
COUGH: 0
WHEEZING: 0
SINUS PAIN: 0
DIARRHEA: 0
SINUS PRESSURE: 0
NAUSEA: 0
CHEST TIGHTNESS: 0
BLOOD IN STOOL: 0
SORE THROAT: 0
EYES NEGATIVE: 1
CONSTIPATION: 0
SHORTNESS OF BREATH: 0

## 2023-05-17 NOTE — PROGRESS NOTES
Social History     Tobacco Use    Smoking status: Never    Smokeless tobacco: Never   Vaping Use    Vaping Use: Never used   Substance Use Topics    Alcohol use: Yes     Comment: occassionally    Drug use: Never      Vitals:    05/17/23 1324   BP: 110/64   Site: Left Upper Arm   Position: Sitting   Cuff Size: Medium Adult   Pulse: 71   Resp: 16   SpO2: 100%   Weight: 155 lb 3.2 oz (70.4 kg)   Height: 5' 5\" (1.651 m)     Estimated body mass index is 25.83 kg/m² as calculated from the following:    Height as of this encounter: 5' 5\" (1.651 m). Weight as of this encounter: 155 lb 3.2 oz (70.4 kg). Physical Exam  Vitals and nursing note reviewed. Constitutional:       General: She is awake. She is not in acute distress. Appearance: Normal appearance. She is well-developed and well-groomed. She is not ill-appearing. HENT:      Head: Normocephalic and atraumatic. Right Ear: Tympanic membrane, ear canal and external ear normal.      Left Ear: Tympanic membrane, ear canal and external ear normal.      Nose: Nose normal.      Mouth/Throat:      Lips: Pink. Mouth: Mucous membranes are moist.      Pharynx: Oropharynx is clear. Eyes:      Extraocular Movements: Extraocular movements intact. Conjunctiva/sclera: Conjunctivae normal.      Pupils: Pupils are equal, round, and reactive to light. Neck:      Thyroid: No thyroid mass, thyromegaly or thyroid tenderness. Vascular: No carotid bruit or JVD. Cardiovascular:      Rate and Rhythm: Normal rate and regular rhythm. Heart sounds: Normal heart sounds, S1 normal and S2 normal. No murmur heard. Pulmonary:      Effort: Pulmonary effort is normal.      Breath sounds: Normal breath sounds and air entry. Abdominal:      General: Abdomen is flat. Bowel sounds are normal.      Palpations: Abdomen is soft. Musculoskeletal:         General: Normal range of motion. Cervical back: Normal range of motion and neck supple.       Right lower

## 2024-10-01 ENCOUNTER — TELEPHONE (OUTPATIENT)
Dept: FAMILY MEDICINE CLINIC | Age: 29
End: 2024-10-01

## 2025-04-05 NOTE — PROGRESS NOTES
32 yoFemale  with h/o Crohn's disease, on chronic steroids bowel obstruction presented with abdominal pain   CT abdomen and pelvis showed Bowel obstruction.    Bowel rest, IV Steroids, hydrate  GI and Surgery consulted   H and P pending (V5) oriented